# Patient Record
Sex: FEMALE | Race: WHITE | NOT HISPANIC OR LATINO | Employment: OTHER | URBAN - METROPOLITAN AREA
[De-identification: names, ages, dates, MRNs, and addresses within clinical notes are randomized per-mention and may not be internally consistent; named-entity substitution may affect disease eponyms.]

---

## 2021-05-24 ENCOUNTER — RECORDS - HEALTHEAST (OUTPATIENT)
Dept: ADMINISTRATIVE | Facility: CLINIC | Age: 76
End: 2021-05-24

## 2021-05-25 ENCOUNTER — RECORDS - HEALTHEAST (OUTPATIENT)
Dept: ADMINISTRATIVE | Facility: CLINIC | Age: 76
End: 2021-05-25

## 2021-05-29 ENCOUNTER — RECORDS - HEALTHEAST (OUTPATIENT)
Dept: ADMINISTRATIVE | Facility: CLINIC | Age: 76
End: 2021-05-29

## 2022-09-19 ENCOUNTER — TRANSFERRED RECORDS (OUTPATIENT)
Dept: MULTI SPECIALTY CLINIC | Facility: CLINIC | Age: 77
End: 2022-09-19

## 2022-09-19 LAB
CREATININE (EXTERNAL): 0.64 MG/DL (ref 0.55–1.02)
GFR ESTIMATED (EXTERNAL): >60 ML/MIN/1.73M2
GLUCOSE (EXTERNAL): 92 MG/DL (ref 70–100)
POTASSIUM (EXTERNAL): 5 MMOL/L (ref 3.5–5.1)

## 2022-09-28 RX ORDER — TRIAMCINOLONE ACETONIDE 1 MG/G
OINTMENT TOPICAL 2 TIMES DAILY PRN
COMMUNITY

## 2022-09-28 RX ORDER — AZITHROMYCIN 250 MG/1
TABLET, FILM COATED ORAL
COMMUNITY

## 2022-09-28 RX ORDER — WARFARIN SODIUM 5 MG/1
7.5-1 TABLET ORAL SEE ADMIN INSTRUCTIONS
COMMUNITY

## 2022-10-01 ENCOUNTER — HEALTH MAINTENANCE LETTER (OUTPATIENT)
Age: 77
End: 2022-10-01

## 2022-10-03 NOTE — PROVIDER NOTIFICATION
Discharge plan according to [facility]:       09/28/22 1427   Discharge Planning   Patient/Family Anticipates Transition to home   Concerns to be Addressed all concerns addressed in this encounter   Living Arrangements   People in Home significant other   Type of Residence Private Residence   Is your private residence a single family home or apartment? Single family home   Stair Railings, Within Home, Primary railings safe and in good condition   Once home, are you able to live on one level? Yes   Which level? Main Level   Bathroom Shower/Tub Walk-in shower   Equipment Currently Used at Home none   Support System   Support Systems Spouse/Significant Other

## 2022-10-07 RX ORDER — ENOXAPARIN SODIUM 100 MG/ML
100 INJECTION SUBCUTANEOUS EVERY 12 HOURS
Status: ON HOLD | COMMUNITY
Start: 2022-09-25 | End: 2022-10-13

## 2022-10-07 NOTE — PROGRESS NOTES
I am evaluating this patient for upcoming Right Total Knee Arthroplasty with Dr. Trotter at [facility] on 10/12/22:    - Patient is heterozygous for Factor V Leiden and is on chronic anticoagulation with Warfarin. Follows with Anticoagulation Clinic at [facility] and it looks like they have given patient instructions to bridge with full therapeutic dose Lovenox before and after surgery while she is holding warfarin for surgery. L/M for patient to please call me back today to discuss instructions for holding warfarin and bridging with Lovenox. Warfarin should be held 5 full days before surgery (last dose should be evening of 10/6, then held) and full therapeutic dose Lovenox must be held full 24 hrs before surgery time (last dose should be before 0800 on day before surgery 10/11/22, then held). I will confirm these directions with patient when she calls back. Call me below if any questions.     - Update 10/7/22 at 5:00 pm: Spoke to patient- confirmed that she will hold Warfarin 5 full days before surgery (last dose taken evening of 10/6/22, then holding). She will make sure to take last dose of therapeutic dose Lovenox by 0800 on 10/11/22, then hold Lovenox for full 24 hrs before surgery.      - Update 10/11/22 at 1:40 pm: patient's surgery time was just moved up to 0840 on 10/12/22. Patient took last dose of Lovenox at 0900 today (10/11/22) so now Lovenox will be held just under 24 hrs before new surgery time. Dr. Trotter notified and is ok proceeding. Preop RN will alert [facility] in case this affects ability to have spinal anesthesia with surgery tomorrow. Call me if any questions.       ALAN Zavala, CNP   Advanced Practice Nurse Navigator- Orthopedics  [facility]   Office Phone: [phone]  Direct Fax: [phone]

## 2022-10-11 ENCOUNTER — ANESTHESIA EVENT (OUTPATIENT)
Dept: SURGERY | Facility: CLINIC | Age: 77
End: 2022-10-11
Payer: MEDICARE

## 2022-10-11 NOTE — TREATMENT PLAN
Orthopedic Surgery Pre-Op Plan: Sagrario Vigil  pre-op review. This is NOT an H&P   Surgeon: Dr. Trotter    [facility]: [facility]  Name of Surgery: Right Total Knee Arthroplasty   Date of Surgery: 10/12/22  H&P: Completed on 9/19/22 by Dr. Tonia Bergman at [facility].   History of ASA, NSAIDS, vitamin and/or herbal supplements within 10 days: No  History of blood thinners: Yes- on chronic anticoagulation with Warfarin for Factor V Leiden with history of PE. Patient instructed to hold warfarin for 5 days before surgery (will take last dose evening of 10/6/22, then hold for surgery). Will bridge with full therapeutic dose Lovenox every 12 hrs and was instructed to hold Lovenox for 24 hrs before surgery. Patient took last dose of Lovenox at 0900 on 10/11/22, however, her surgery time was moved up to start at 0840 on 10/12/22. Dr. Trotter notified and is ok proceeding. Pre-op RN will alert [facility] as this could affect whether patient can receive spinal/neuraxial anesthesia.     Plan:   1) Discharge Plan: Home POD 1 with assist of Significant Other. Please see Discharge Planning section near bottom of this note for further details.     2) Coagulation Disorder: Factor V Leiden (Heterozygous) with history of submassive PE in 2/2020: On chronic anticoagulation with warfarin.  Follows with anticoagulation clinic through [facility].  Instructed to hold warfarin for 5 days before surgery (will take last dose evening of 10/6/2022, then hold it for surgery).  Bridging with full therapeutic dose Lovenox every 12 hours.  Patient was instructed to hold Lovenox for 24 hours before surgery.  However, surgery time was moved up.  Last dose of Lovenox taken just under 24 hours at 09 100 on 10/11/2022.  Dr. Trotter notified and is okay proceeding with surgery.  Preop RN will alert [facility] as this could affect whether patient is able to receive spinal/neuraxial anesthesia.  After surgery: Anticoagulation Clinic recommends  that warfarin is resumed evening of surgery and patient resumes bridging with Lovenox the morning after surgery if okay with orthopedic surgeon.  Bridging would continue until INR is back therapeutic on warfarin.  I will alert Dr. Trotter's team to do this recommendation.    3) Dyspnea on Exertion:  PCP ordered stress testing prior to surgery. NM Stress Test on 9/27/22:   Summary     1. A regadenoson infusion pharmacologic stress test was performed.     2. Negative stress ECG for ST segment depression. Normal hemodynamic   response to lexiscan infusion. No ectopy noted.     3. The patient experienced shortness of breath with infusion of lexiscan   during the stress test. Symptom resolved in recovery.     4. Myocardial perfusion imaging is normal.     5. Normal left ventricular systolic function. Calculated LVEF 64 %.     6. Based on this study, the annual cardiovascular mortality rate is low   (less than 1%).     Patient cleared by PCP for surgery at low risk after stress test above.      4) Hypertension: Appears well controlled on triamterene-hydrochlorothiazide.  Patient instructed to hold triamterene-hydrochlorothiazide on the morning of surgery.    5) History of Infection of Left Prosthetic Knee in 2015: S/P I&D, Poly-exchange: cultures showed MSSA. Treated with IV Vancomycin and rifampin.     6) Obesity: BMI 39, Wt: 229 lbs. recommend continued efforts at safe weight loss following recovery from surgery.     Patient appears medically optimized for upcoming surgery. I would recommend Hospitalist Consult to assist with medical management. Please call me below with any questions on this patient.       Review of Systems Notable for: Coagulation disorder-factor V Leiden (heterozygous) with history of PE-on chronic anticoagulation with warfarin, dyspnea on exertion-cleared for surgery after negative stress test, hypertension, history of infection of left prosthetic knee in 2015, obesity.    Past Medical History:    Past Medical History:   Diagnosis Date     Arthritis     Osteoarthritis     Coagulation disorder (H)     Factor V Leiden Hx of DVT and PE (in 2/2020)     Hypertension      MRSA infection     Hx of MRSA infaction post-op knee and was placed on ABX     Squamous cell carcinoma of skin of face     with MOHs procedure     Past Surgical History:   Procedure Laterality Date     FIBULA FRACTURE SURGERY       JOINT REPLACEMENT Bilateral     hip     LUMPECTOMY BREAST      benign     MOHS MICROGRAPHIC PROCEDURE       OTHER SURGICAL HISTORY      fractured leg     PICC AND MIDLINE TEAM LINE INSERTION  12/7/2015          [facility] REVISE KNEE JOINT REPLACE,ALL PARTS Left 12/6/2015    Procedure: Left Knee Incision and Drainage TIBIAL INSERT EXCHANGE;  Surgeon: Rinku Evans MD;  Location: [facility];  Service: Orthopedics     [facility] TOTAL KNEE ARTHROPLASTY Left 4/27/2015    Procedure: KNEE TOTAL ARTHROPLASTY LEFT;  Surgeon: Randolph Trotter MD;  Location: [facility] OR;  Service: Orthopedics       Current Medications:  Patient's Medications   New Prescriptions    No medications on file   Previous Medications    AZITHROMYCIN (ZITHROMAX) 250 MG TABLET    Before dental procedures    CLINDAMYCIN (CLEOCIN) 300 MG CAPSULE    Before dermatology procedures    DIPHENHYDRAMINE HCL (BENADRYL ALLERGY ORAL)    [DIPHENHYDRAMINE HCL (BENADRYL ALLERGY ORAL)] Take by mouth.    DIPHENHYDRAMINE-ACETAMINOPHEN (TYLENOL PM)  MG TAB    [DIPHENHYDRAMINE-ACETAMINOPHEN (TYLENOL PM)  MG TAB] Take 1 tablet by mouth bedtime.     ENOXAPARIN ANTICOAGULANT (LOVENOX) 100 MG/ML SYRINGE    Inject 100 mg Subcutaneous every 12 hours    EPINEPHRINE (EPIPEN) 0.3 MG/0.3 ML ATIN    [EPINEPHRINE (EPIPEN) 0.3 MG/0.3 ML ATIN] Inject 0.3 mg into the shoulder, thigh, or buttocks.    MEDICATION CANNOT BE REORDERED - PLEASE MANUALLY REORDER AND DISCONTINUE THE OLD ORDER    [EPINEPHRINE 0.3 MG/0.3 ML CMPK] Inject 0.3 mg into the shoulder, thigh, or buttocks  daily as needed.     RIFAMPIN (RIFADIN) 300 MG CAPSULE    Only when she had the infection    TRIAMCINOLONE (KENALOG) 0.1 % EXTERNAL OINTMENT        TRIAMTERENE-HYDROCHLOROTHIAZIDE (MAXZIDE-25) 37.5-25 MG PER TABLET    [TRIAMTERENE-HYDROCHLOROTHIAZIDE (MAXZIDE-25) 37.5-25 MG PER TABLET] Take 1 tablet by mouth daily.    TRIAMTERENE-HYDROCHLOROTHIAZIDE (MAXZIDE-25) 37.5-25 MG PER TABLET    [TRIAMTERENE-HYDROCHLOROTHIAZIDE (MAXZIDE-25) 37.5-25 MG PER TABLET] Take by mouth.    UNABLE TO FIND    [UNABLE TO FIND] Med Name:slimgenics-vitamin and other supplements    VALACYCLOVIR (VALTREX) 1000 MG TABLET    [VALACYCLOVIR (VALTREX) 1000 MG TABLET] Take 1,000 mg by mouth.    WARFARIN ANTICOAGULANT (COUMADIN) 5 MG TABLET    Take 5 mg by mouth   Modified Medications    No medications on file   Discontinued Medications    DOXYCYCLINE (MONODOX) 100 MG CAPSULE    [DOXYCYCLINE (MONODOX) 100 MG CAPSULE] TK ONE C PO  BID    DOXYCYCLINE (MONODOX) 100 MG CAPSULE    [DOXYCYCLINE (MONODOX) 100 MG CAPSULE] TAKE ONE CAPSULE BY MOUTH TWICE DAILY    DOXYCYCLINE (VIBRAMYCIN) 100 MG CAPSULE    [DOXYCYCLINE (VIBRAMYCIN) 100 MG CAPSULE] Take 100 mg by mouth daily.       ALLERGIES:  Allergies   Allergen Reactions     Fish Containing Products [Fish-Derived Products] Anaphylaxis     Fresh water fish, can eat shrimp     Adhesive Tape      Amoxicillin Hives     Bacitracin Itching     Banana GI Disturbance     Not latex     Codeine Hives     Tolerates oxycodone     Indocin [Indomethacin] Hives     Neosporin (Vfe-Stf-Adggl) [Triple Antibiotic] Itching       Social History  Social History     Tobacco Use     Smoking status: Former     Packs/day: 0.25     Years: 25.00     Pack years: 6.25     Types: Cigarettes     Smokeless tobacco: Never   Substance Use Topics     Alcohol use: Yes     Comment: 2-3 ETOH WKLY     Drug use: No       Any Abnormal Recent Diagnostics? No      Discharge Planning:   Discharge plan according to [facility]:           09/28/22 1420   Discharge Planning   Patient/Family Anticipates Transition to home   Concerns to be Addressed all concerns addressed in this encounter   Living Arrangements   People in Home significant other   Type of Residence Private Residence   Is your private residence a single family home or apartment? Single family home   Stair Railings, Within Home, Primary railings safe and in good condition   Once home, are you able to live on one level? Yes   Which level? Main Level   Bathroom Shower/Tub Walk-in shower   Equipment Currently Used at Home none   Support System   Support Systems Spouse/Significant Other        ALAN Zavala, CNP   Advanced Practice Nurse Navigator- Orthopedics  [facility]   Phone: [phone]

## 2022-10-12 ENCOUNTER — HOSPITAL ENCOUNTER (OUTPATIENT)
Facility: CLINIC | Age: 77
LOS: 1 days | Discharge: HOME | End: 2022-10-13
Attending: ORTHOPAEDIC SURGERY
Payer: MEDICARE

## 2022-10-12 ENCOUNTER — ANESTHESIA (OUTPATIENT)
Dept: SURGERY | Facility: CLINIC | Age: 77
End: 2022-10-12
Payer: MEDICARE

## 2022-10-12 DIAGNOSIS — D68.51 FACTOR V LEIDEN (H): ICD-10-CM

## 2022-10-12 DIAGNOSIS — Z96.651 S/P TOTAL KNEE ARTHROPLASTY, RIGHT: ICD-10-CM

## 2022-10-12 DIAGNOSIS — M17.11 PRIMARY OSTEOARTHRITIS OF RIGHT KNEE: Primary | ICD-10-CM

## 2022-10-12 PROBLEM — Z86.711 HISTORY OF PULMONARY EMBOLISM: Status: ACTIVE | Noted: 2022-10-12

## 2022-10-12 PROBLEM — I10 HYPERTENSION: Status: ACTIVE | Noted: 2022-10-12

## 2022-10-12 PROBLEM — M17.9 KNEE OSTEOARTHRITIS: Status: ACTIVE | Noted: 2022-10-12

## 2022-10-12 PROBLEM — Z86.14 HISTORY OF MRSA INFECTION: Status: ACTIVE | Noted: 2022-10-12

## 2022-10-12 LAB
CREAT SERPL-MCNC: 0.77 MG/DL (ref 0.6–1.1)
GFR SERPL CREATININE-BSD FRML MDRD: 79 ML/MIN/1.73M2
INR PPP: 1.25 (ref 0.85–1.15)
MRSA DNA SPEC QL NAA+PROBE: NEGATIVE
PLATELET # BLD AUTO: 158 10E3/UL (ref 150–450)
SA TARGET DNA: NEGATIVE

## 2022-10-12 PROCEDURE — 250N000011 HC RX IP 250 OP 636: Performed by: NURSE ANESTHETIST, CERTIFIED REGISTERED

## 2022-10-12 PROCEDURE — 370N000017 HC ANESTHESIA TECHNICAL FEE, PER MIN: Performed by: ORTHOPAEDIC SURGERY

## 2022-10-12 PROCEDURE — 258N000003 HC RX IP 258 OP 636: Performed by: ANESTHESIOLOGY

## 2022-10-12 PROCEDURE — 250N000013 HC RX MED GY IP 250 OP 250 PS 637: Performed by: PHYSICIAN ASSISTANT

## 2022-10-12 PROCEDURE — 99223 1ST HOSP IP/OBS HIGH 75: CPT | Performed by: FAMILY MEDICINE

## 2022-10-12 PROCEDURE — 85610 PROTHROMBIN TIME: CPT | Performed by: NURSE PRACTITIONER

## 2022-10-12 PROCEDURE — 710N000010 HC RECOVERY PHASE 1, LEVEL 2, PER MIN: Performed by: ORTHOPAEDIC SURGERY

## 2022-10-12 PROCEDURE — 85049 AUTOMATED PLATELET COUNT: CPT | Performed by: ORTHOPAEDIC SURGERY

## 2022-10-12 PROCEDURE — 250N000013 HC RX MED GY IP 250 OP 250 PS 637: Performed by: ORTHOPAEDIC SURGERY

## 2022-10-12 PROCEDURE — 250N000011 HC RX IP 250 OP 636: Performed by: ANESTHESIOLOGY

## 2022-10-12 PROCEDURE — 250N000011 HC RX IP 250 OP 636: Performed by: ORTHOPAEDIC SURGERY

## 2022-10-12 PROCEDURE — 272N000001 HC OR GENERAL SUPPLY STERILE: Performed by: ORTHOPAEDIC SURGERY

## 2022-10-12 PROCEDURE — 82565 ASSAY OF CREATININE: CPT | Performed by: ORTHOPAEDIC SURGERY

## 2022-10-12 PROCEDURE — 258N000003 HC RX IP 258 OP 636: Performed by: ORTHOPAEDIC SURGERY

## 2022-10-12 PROCEDURE — 258N000003 HC RX IP 258 OP 636: Performed by: PHYSICIAN ASSISTANT

## 2022-10-12 PROCEDURE — 36415 COLL VENOUS BLD VENIPUNCTURE: CPT | Performed by: NURSE PRACTITIONER

## 2022-10-12 PROCEDURE — 87641 MR-STAPH DNA AMP PROBE: CPT | Performed by: FAMILY MEDICINE

## 2022-10-12 PROCEDURE — 360N000077 HC SURGERY LEVEL 4, PER MIN: Performed by: ORTHOPAEDIC SURGERY

## 2022-10-12 PROCEDURE — C1713 ANCHOR/SCREW BN/BN,TIS/BN: HCPCS | Performed by: ORTHOPAEDIC SURGERY

## 2022-10-12 PROCEDURE — C1776 JOINT DEVICE (IMPLANTABLE): HCPCS | Performed by: ORTHOPAEDIC SURGERY

## 2022-10-12 PROCEDURE — 999N000141 HC STATISTIC PRE-PROCEDURE NURSING ASSESSMENT: Performed by: ORTHOPAEDIC SURGERY

## 2022-10-12 PROCEDURE — 250N000011 HC RX IP 250 OP 636: Performed by: PHYSICIAN ASSISTANT

## 2022-10-12 PROCEDURE — 120N000001 HC R&B MED SURG/OB

## 2022-10-12 PROCEDURE — 250N000009 HC RX 250: Performed by: ORTHOPAEDIC SURGERY

## 2022-10-12 PROCEDURE — 250N000009 HC RX 250: Performed by: NURSE ANESTHETIST, CERTIFIED REGISTERED

## 2022-10-12 PROCEDURE — 250N000009 HC RX 250: Performed by: ANESTHESIOLOGY

## 2022-10-12 PROCEDURE — 258N000001 HC RX 258: Performed by: ORTHOPAEDIC SURGERY

## 2022-10-12 PROCEDURE — 258N000003 HC RX IP 258 OP 636: Performed by: NURSE ANESTHETIST, CERTIFIED REGISTERED

## 2022-10-12 PROCEDURE — 36415 COLL VENOUS BLD VENIPUNCTURE: CPT | Performed by: ORTHOPAEDIC SURGERY

## 2022-10-12 DEVICE — IMPLANTABLE DEVICE
Type: IMPLANTABLE DEVICE | Site: KNEE | Status: FUNCTIONAL
Brand: PERSONA™

## 2022-10-12 DEVICE — IMPLANTABLE DEVICE
Type: IMPLANTABLE DEVICE | Site: KNEE | Status: FUNCTIONAL
Brand: PERSONA®

## 2022-10-12 DEVICE — BONE CEMENT RADIOPAQUE SIMPLEX HV FULL DOSE 6194-1-001: Type: IMPLANTABLE DEVICE | Site: KNEE | Status: FUNCTIONAL

## 2022-10-12 DEVICE — IMPLANTABLE DEVICE
Type: IMPLANTABLE DEVICE | Site: KNEE | Status: FUNCTIONAL
Brand: PERSONA® NATURAL TIBIA®

## 2022-10-12 RX ORDER — DEXAMETHASONE SODIUM PHOSPHATE 4 MG/ML
INJECTION, SOLUTION INTRA-ARTICULAR; INTRALESIONAL; INTRAMUSCULAR; INTRAVENOUS; SOFT TISSUE PRN
Status: DISCONTINUED | OUTPATIENT
Start: 2022-10-12 | End: 2022-10-12

## 2022-10-12 RX ORDER — MAGNESIUM HYDROXIDE 1200 MG/15ML
LIQUID ORAL PRN
Status: DISCONTINUED | OUTPATIENT
Start: 2022-10-12 | End: 2022-10-12 | Stop reason: HOSPADM

## 2022-10-12 RX ORDER — LIDOCAINE 40 MG/G
CREAM TOPICAL
Status: DISCONTINUED | OUTPATIENT
Start: 2022-10-12 | End: 2022-10-12

## 2022-10-12 RX ORDER — ONDANSETRON 4 MG/1
4 TABLET, ORALLY DISINTEGRATING ORAL EVERY 6 HOURS PRN
Status: DISCONTINUED | OUTPATIENT
Start: 2022-10-12 | End: 2022-10-13 | Stop reason: HOSPADM

## 2022-10-12 RX ORDER — TRANEXAMIC ACID 650 MG/1
1950 TABLET ORAL ONCE
Status: COMPLETED | OUTPATIENT
Start: 2022-10-12 | End: 2022-10-12

## 2022-10-12 RX ORDER — ONDANSETRON 2 MG/ML
INJECTION INTRAMUSCULAR; INTRAVENOUS PRN
Status: DISCONTINUED | OUTPATIENT
Start: 2022-10-12 | End: 2022-10-12

## 2022-10-12 RX ORDER — PROPOFOL 10 MG/ML
INJECTION, EMULSION INTRAVENOUS CONTINUOUS PRN
Status: DISCONTINUED | OUTPATIENT
Start: 2022-10-12 | End: 2022-10-12

## 2022-10-12 RX ORDER — PROCHLORPERAZINE MALEATE 5 MG
5 TABLET ORAL EVERY 6 HOURS PRN
Status: DISCONTINUED | OUTPATIENT
Start: 2022-10-12 | End: 2022-10-13 | Stop reason: HOSPADM

## 2022-10-12 RX ORDER — FENTANYL CITRATE 50 UG/ML
25 INJECTION, SOLUTION INTRAMUSCULAR; INTRAVENOUS EVERY 5 MIN PRN
Status: DISCONTINUED | OUTPATIENT
Start: 2022-10-12 | End: 2022-10-12 | Stop reason: HOSPADM

## 2022-10-12 RX ORDER — LIDOCAINE 40 MG/G
CREAM TOPICAL
Status: DISCONTINUED | OUTPATIENT
Start: 2022-10-12 | End: 2022-10-12 | Stop reason: HOSPADM

## 2022-10-12 RX ORDER — FENTANYL CITRATE 50 UG/ML
50 INJECTION, SOLUTION INTRAMUSCULAR; INTRAVENOUS
Status: DISCONTINUED | OUTPATIENT
Start: 2022-10-12 | End: 2022-10-12 | Stop reason: HOSPADM

## 2022-10-12 RX ORDER — AMOXICILLIN 250 MG
1-2 CAPSULE ORAL 2 TIMES DAILY
Qty: 30 TABLET | Refills: 0 | Status: SHIPPED | OUTPATIENT
Start: 2022-10-12

## 2022-10-12 RX ORDER — GLYCOPYRROLATE 0.2 MG/ML
INJECTION, SOLUTION INTRAMUSCULAR; INTRAVENOUS PRN
Status: DISCONTINUED | OUTPATIENT
Start: 2022-10-12 | End: 2022-10-12

## 2022-10-12 RX ORDER — OXYCODONE HYDROCHLORIDE 5 MG/1
5 TABLET ORAL EVERY 4 HOURS PRN
Status: DISCONTINUED | OUTPATIENT
Start: 2022-10-12 | End: 2022-10-12 | Stop reason: HOSPADM

## 2022-10-12 RX ORDER — NALOXONE HYDROCHLORIDE 0.4 MG/ML
0.2 INJECTION, SOLUTION INTRAMUSCULAR; INTRAVENOUS; SUBCUTANEOUS
Status: DISCONTINUED | OUTPATIENT
Start: 2022-10-12 | End: 2022-10-13 | Stop reason: HOSPADM

## 2022-10-12 RX ORDER — AMOXICILLIN 250 MG
1 CAPSULE ORAL 2 TIMES DAILY
Status: DISCONTINUED | OUTPATIENT
Start: 2022-10-12 | End: 2022-10-13 | Stop reason: HOSPADM

## 2022-10-12 RX ORDER — POLYETHYLENE GLYCOL 3350 17 G/17G
17 POWDER, FOR SOLUTION ORAL DAILY
Status: DISCONTINUED | OUTPATIENT
Start: 2022-10-13 | End: 2022-10-13 | Stop reason: HOSPADM

## 2022-10-12 RX ORDER — HYDROMORPHONE HCL IN WATER/PF 6 MG/30 ML
0.2 PATIENT CONTROLLED ANALGESIA SYRINGE INTRAVENOUS EVERY 5 MIN PRN
Status: DISCONTINUED | OUTPATIENT
Start: 2022-10-12 | End: 2022-10-12 | Stop reason: HOSPADM

## 2022-10-12 RX ORDER — LIDOCAINE HYDROCHLORIDE 10 MG/ML
INJECTION, SOLUTION INFILTRATION; PERINEURAL PRN
Status: DISCONTINUED | OUTPATIENT
Start: 2022-10-12 | End: 2022-10-12

## 2022-10-12 RX ORDER — CLINDAMYCIN PHOSPHATE 900 MG/50ML
900 INJECTION, SOLUTION INTRAVENOUS SEE ADMIN INSTRUCTIONS
Status: DISCONTINUED | OUTPATIENT
Start: 2022-10-12 | End: 2022-10-12 | Stop reason: HOSPADM

## 2022-10-12 RX ORDER — HYDROMORPHONE HCL IN WATER/PF 6 MG/30 ML
0.4 PATIENT CONTROLLED ANALGESIA SYRINGE INTRAVENOUS
Status: DISCONTINUED | OUTPATIENT
Start: 2022-10-12 | End: 2022-10-13 | Stop reason: HOSPADM

## 2022-10-12 RX ORDER — PROPOFOL 10 MG/ML
INJECTION, EMULSION INTRAVENOUS PRN
Status: DISCONTINUED | OUTPATIENT
Start: 2022-10-12 | End: 2022-10-12

## 2022-10-12 RX ORDER — NALOXONE HYDROCHLORIDE 0.4 MG/ML
0.4 INJECTION, SOLUTION INTRAMUSCULAR; INTRAVENOUS; SUBCUTANEOUS
Status: DISCONTINUED | OUTPATIENT
Start: 2022-10-12 | End: 2022-10-13 | Stop reason: HOSPADM

## 2022-10-12 RX ORDER — SODIUM CHLORIDE, SODIUM LACTATE, POTASSIUM CHLORIDE, CALCIUM CHLORIDE 600; 310; 30; 20 MG/100ML; MG/100ML; MG/100ML; MG/100ML
INJECTION, SOLUTION INTRAVENOUS CONTINUOUS
Status: DISCONTINUED | OUTPATIENT
Start: 2022-10-12 | End: 2022-10-12 | Stop reason: HOSPADM

## 2022-10-12 RX ORDER — ACETAMINOPHEN 325 MG/1
650 TABLET ORAL EVERY 4 HOURS PRN
Status: DISCONTINUED | OUTPATIENT
Start: 2022-10-15 | End: 2022-10-13 | Stop reason: HOSPADM

## 2022-10-12 RX ORDER — ONDANSETRON 4 MG/1
4 TABLET, ORALLY DISINTEGRATING ORAL EVERY 30 MIN PRN
Status: DISCONTINUED | OUTPATIENT
Start: 2022-10-12 | End: 2022-10-12 | Stop reason: HOSPADM

## 2022-10-12 RX ORDER — ENOXAPARIN SODIUM 100 MG/ML
100 INJECTION SUBCUTANEOUS EVERY 12 HOURS
Status: DISCONTINUED | OUTPATIENT
Start: 2022-10-13 | End: 2022-10-13 | Stop reason: HOSPADM

## 2022-10-12 RX ORDER — HYDROMORPHONE HYDROCHLORIDE 4 MG/1
4 TABLET ORAL EVERY 4 HOURS PRN
Status: DISCONTINUED | OUTPATIENT
Start: 2022-10-12 | End: 2022-10-13 | Stop reason: HOSPADM

## 2022-10-12 RX ORDER — BUPIVACAINE HYDROCHLORIDE AND EPINEPHRINE 2.5; 5 MG/ML; UG/ML
INJECTION, SOLUTION INFILTRATION; PERINEURAL
Status: COMPLETED | OUTPATIENT
Start: 2022-10-12 | End: 2022-10-12

## 2022-10-12 RX ORDER — HYDROMORPHONE HYDROCHLORIDE 2 MG/1
2 TABLET ORAL EVERY 4 HOURS PRN
Status: DISCONTINUED | OUTPATIENT
Start: 2022-10-12 | End: 2022-10-13 | Stop reason: HOSPADM

## 2022-10-12 RX ORDER — BISACODYL 10 MG
10 SUPPOSITORY, RECTAL RECTAL DAILY PRN
Status: DISCONTINUED | OUTPATIENT
Start: 2022-10-12 | End: 2022-10-13 | Stop reason: HOSPADM

## 2022-10-12 RX ORDER — ONDANSETRON 2 MG/ML
4 INJECTION INTRAMUSCULAR; INTRAVENOUS EVERY 6 HOURS PRN
Status: DISCONTINUED | OUTPATIENT
Start: 2022-10-12 | End: 2022-10-13 | Stop reason: HOSPADM

## 2022-10-12 RX ORDER — ACETAMINOPHEN 325 MG/1
325-650 TABLET ORAL EVERY 6 HOURS PRN
Status: ON HOLD | COMMUNITY
End: 2022-10-13

## 2022-10-12 RX ORDER — BUPIVACAINE HYDROCHLORIDE 7.5 MG/ML
INJECTION, SOLUTION INTRASPINAL
Status: COMPLETED | OUTPATIENT
Start: 2022-10-12 | End: 2022-10-12

## 2022-10-12 RX ORDER — HYDROMORPHONE HYDROCHLORIDE 2 MG/1
2-4 TABLET ORAL EVERY 4 HOURS PRN
Qty: 28 TABLET | Refills: 0 | Status: SHIPPED | OUTPATIENT
Start: 2022-10-12

## 2022-10-12 RX ORDER — TRIAMTERENE/HYDROCHLOROTHIAZID 37.5-25 MG
1 TABLET ORAL DAILY
Status: DISCONTINUED | OUTPATIENT
Start: 2022-10-12 | End: 2022-10-13 | Stop reason: HOSPADM

## 2022-10-12 RX ORDER — FLUOROURACIL 50 MG/G
CREAM TOPICAL 2 TIMES DAILY PRN
COMMUNITY

## 2022-10-12 RX ORDER — ACETAMINOPHEN 325 MG/1
975 TABLET ORAL EVERY 8 HOURS
Status: DISCONTINUED | OUTPATIENT
Start: 2022-10-12 | End: 2022-10-13 | Stop reason: HOSPADM

## 2022-10-12 RX ORDER — SODIUM CHLORIDE, SODIUM LACTATE, POTASSIUM CHLORIDE, CALCIUM CHLORIDE 600; 310; 30; 20 MG/100ML; MG/100ML; MG/100ML; MG/100ML
INJECTION, SOLUTION INTRAVENOUS CONTINUOUS
Status: DISCONTINUED | OUTPATIENT
Start: 2022-10-12 | End: 2022-10-12

## 2022-10-12 RX ORDER — HYDROMORPHONE HCL IN WATER/PF 6 MG/30 ML
0.2 PATIENT CONTROLLED ANALGESIA SYRINGE INTRAVENOUS
Status: DISCONTINUED | OUTPATIENT
Start: 2022-10-12 | End: 2022-10-13 | Stop reason: HOSPADM

## 2022-10-12 RX ORDER — ONDANSETRON 2 MG/ML
4 INJECTION INTRAMUSCULAR; INTRAVENOUS EVERY 30 MIN PRN
Status: DISCONTINUED | OUTPATIENT
Start: 2022-10-12 | End: 2022-10-12 | Stop reason: HOSPADM

## 2022-10-12 RX ORDER — CLINDAMYCIN PHOSPHATE 900 MG/50ML
900 INJECTION, SOLUTION INTRAVENOUS
Status: DISCONTINUED | OUTPATIENT
Start: 2022-10-12 | End: 2022-10-12 | Stop reason: HOSPADM

## 2022-10-12 RX ORDER — WARFARIN SODIUM 7.5 MG/1
7.5 TABLET ORAL
Status: COMPLETED | OUTPATIENT
Start: 2022-10-12 | End: 2022-10-12

## 2022-10-12 RX ORDER — FENTANYL CITRATE 50 UG/ML
100 INJECTION, SOLUTION INTRAMUSCULAR; INTRAVENOUS
Status: DISCONTINUED | OUTPATIENT
Start: 2022-10-12 | End: 2022-10-12 | Stop reason: HOSPADM

## 2022-10-12 RX ADMIN — BUPIVACAINE HYDROCHLORIDE IN DEXTROSE 1.6 ML: 7.5 INJECTION, SOLUTION SUBARACHNOID at 09:00

## 2022-10-12 RX ADMIN — TRANEXAMIC ACID 1950 MG: 650 TABLET ORAL at 07:16

## 2022-10-12 RX ADMIN — VANCOMYCIN HYDROCHLORIDE 1500 MG: 5 INJECTION, POWDER, LYOPHILIZED, FOR SOLUTION INTRAVENOUS at 18:00

## 2022-10-12 RX ADMIN — SODIUM CHLORIDE, POTASSIUM CHLORIDE, SODIUM LACTATE AND CALCIUM CHLORIDE: 600; 310; 30; 20 INJECTION, SOLUTION INTRAVENOUS at 07:18

## 2022-10-12 RX ADMIN — FENTANYL CITRATE 50 MCG: 50 INJECTION, SOLUTION INTRAMUSCULAR; INTRAVENOUS at 08:26

## 2022-10-12 RX ADMIN — PROPOFOL 40 MG: 10 INJECTION, EMULSION INTRAVENOUS at 09:11

## 2022-10-12 RX ADMIN — WARFARIN SODIUM 7.5 MG: 7.5 TABLET ORAL at 18:00

## 2022-10-12 RX ADMIN — DEXAMETHASONE SODIUM PHOSPHATE 10 MG: 4 INJECTION, SOLUTION INTRA-ARTICULAR; INTRALESIONAL; INTRAMUSCULAR; INTRAVENOUS; SOFT TISSUE at 09:19

## 2022-10-12 RX ADMIN — ACETAMINOPHEN 975 MG: 325 TABLET, FILM COATED ORAL at 23:11

## 2022-10-12 RX ADMIN — PROPOFOL 150 MCG/KG/MIN: 10 INJECTION, EMULSION INTRAVENOUS at 09:11

## 2022-10-12 RX ADMIN — CLINDAMYCIN PHOSPHATE 900 MG: 900 INJECTION, SOLUTION INTRAVENOUS at 07:27

## 2022-10-12 RX ADMIN — PHENYLEPHRINE HYDROCHLORIDE 100 MCG: 10 INJECTION INTRAVENOUS at 09:21

## 2022-10-12 RX ADMIN — MIDAZOLAM HYDROCHLORIDE 2 MG: 1 INJECTION, SOLUTION INTRAMUSCULAR; INTRAVENOUS at 08:25

## 2022-10-12 RX ADMIN — SENNOSIDES AND DOCUSATE SODIUM 1 TABLET: 50; 8.6 TABLET ORAL at 20:14

## 2022-10-12 RX ADMIN — PHENYLEPHRINE HYDROCHLORIDE 0.2 MCG/KG/MIN: 10 INJECTION INTRAVENOUS at 09:23

## 2022-10-12 RX ADMIN — SODIUM CHLORIDE, POTASSIUM CHLORIDE, SODIUM LACTATE AND CALCIUM CHLORIDE: 600; 310; 30; 20 INJECTION, SOLUTION INTRAVENOUS at 10:09

## 2022-10-12 RX ADMIN — BUPIVACAINE HYDROCHLORIDE AND EPINEPHRINE BITARTRATE 15 ML: 2.5; .005 INJECTION, SOLUTION INFILTRATION; PERINEURAL at 08:26

## 2022-10-12 RX ADMIN — ACETAMINOPHEN 975 MG: 325 TABLET, FILM COATED ORAL at 14:49

## 2022-10-12 RX ADMIN — GLYCOPYRROLATE 0.2 MG: 0.2 INJECTION, SOLUTION INTRAMUSCULAR; INTRAVENOUS at 09:47

## 2022-10-12 RX ADMIN — ONDANSETRON 4 MG: 2 INJECTION INTRAMUSCULAR; INTRAVENOUS at 10:09

## 2022-10-12 RX ADMIN — LIDOCAINE HYDROCHLORIDE 20 MG: 10 INJECTION, SOLUTION INFILTRATION; PERINEURAL at 09:11

## 2022-10-12 ASSESSMENT — ACTIVITIES OF DAILY LIVING (ADL)
DIFFICULTY_COMMUNICATING: NO
WEAR_GLASSES_OR_BLIND: YES
CONCENTRATING,_REMEMBERING_OR_MAKING_DECISIONS_DIFFICULTY: NO
WALKING_OR_CLIMBING_STAIRS_DIFFICULTY: YES
EQUIPMENT_CURRENTLY_USED_AT_HOME: CANE, STRAIGHT
ADLS_ACUITY_SCORE: 26
TOILETING_ISSUES: NO
DIFFICULTY_EATING/SWALLOWING: NO
ADLS_ACUITY_SCORE: 26
TRANSFERRING: 1-->ASSISTANCE (EQUIPMENT/PERSON) NEEDED
ADLS_ACUITY_SCORE: 28
CHANGE_IN_FUNCTIONAL_STATUS_SINCE_ONSET_OF_CURRENT_ILLNESS/INJURY: NO
TRANSFERRING: 0-->ASSISTANCE NEEDED (DEVELOPMENTALLY APPROPRIATE)
WALKING_OR_CLIMBING_STAIRS: AMBULATION DIFFICULTY, REQUIRES EQUIPMENT
ADLS_ACUITY_SCORE: 28
HEARING_DIFFICULTY_OR_DEAF: NO
FALL_HISTORY_WITHIN_LAST_SIX_MONTHS: YES
ADLS_ACUITY_SCORE: 26
ADLS_ACUITY_SCORE: 22
ADLS_ACUITY_SCORE: 24
NUMBER_OF_TIMES_PATIENT_HAS_FALLEN_WITHIN_LAST_SIX_MONTHS: 1
ADLS_ACUITY_SCORE: 28
ADLS_ACUITY_SCORE: 30
DRESSING/BATHING_DIFFICULTY: NO
DOING_ERRANDS_INDEPENDENTLY_DIFFICULTY: NO

## 2022-10-12 NOTE — PHARMACY-ANTICOAGULATION SERVICE
Clinical Pharmacy - Warfarin Dosing Consult     Pharmacy has been consulted to manage this patient s warfarin therapy.  Indication: Other - specify in comments (Hx of DVT, Factor V Leiden mutation)  Therapy Goal: INR 2-3  Warfarin Prior to Admission: Yes  Warfarin PTA Regimen: 10 mg Mondays and Fridays. 7.5 mg all other days of the week.  Significant drug interactions: Lovenox - being bridged until INR > 1.8. New meds: Clindamycin pre-op, vanco post-op x 1.  Dose Comments: Resuming warfarin post-op. Will start with home dose and monitor.    INR   Date Value Ref Range Status   10/12/2022 1.25 (H) 0.85 - 1.15 Final       Recommend warfarin 7.5 mg today.  Pharmacy will monitor Sagrario Vigil daily and order warfarin doses to achieve specified goal.      Please contact pharmacy as soon as possible if the warfarin needs to be held for a procedure or if the warfarin goals change.

## 2022-10-12 NOTE — OP NOTE
Operative Report    PATIENT:  Sagrario Vigil    DATE OF SURGERY:  10/12/2022    SURGEON  Randolph Trotter MD.      FIRST ASSISTANT  Marcio Doss PA-C  (Expert CLEOPATRA assist was required throughout for patient positioning, soft tissue retraction, appropriate use of knee instrumentation, and patient safety)     PREOPERATIVE DIAGNOSIS  right knee osteoarthritis     POSTOPERATIVE DIAGNOSIS  right knee osteoarthritis.         PROCEDURE  right Total Knee Arthroplasty.         ANESTHESIA  Spinal    SPECIMENS: none     ESTIMATED BLOOD LOSS  100cc     INDICATIONS  Ms. Sagrario Vigil is a pleasant 77 year old-year-old female with an ongoing history of increasing and progressive pain in the right knee with severe disability. Pain and disability due to knee arthritis are severely affecting quality of life and ability to perform even simple activities of daily living.  X-rays have shown bone-on-bone degenerative change. Consequently after trying and failing all conservative management of knee arthritis, discussion regarding the risk and benefits of knee replacement was undertaken and the patient elected to proceed.     FINDINGS:  The operative knee showed a severe full-thickness cartilage loss on the femur and tibia in the medial compartment of the knee.  The patellofemoral joint also showed advanced arthritic changes.      IMPLANTS  1. Raquel, Persona, CR femoral component, size 8 .  2. Raquel, Persona, tibial component, size E.  3. Raquel, Persona,  all polyethylene articular surface 10 mm thickness.  UC  4. Raquel, Persona, all polyethylene patellar button, 35mm diameter      PROCEDURE  Once consent was obtained and the operative site marked in the preop holding area, the patient was brought to the operating room.  Anesthesia was established without difficulty. All bony prominences and the non-operative leg were padded appropriately. The right leg was sterilely prepped and draped in the usual fashion after placement of a  proximal tourniquet.  Tourniquet was never inflated.     A longitudinal incision made over the knee.  Dissection was carried down through the extensor mechanism.  A medial parapatellar arthrotomy  was performed.  The patella was luxed laterally and protected. Standard medial release performed.    An intramedullary guide was used in the femur.  The distal femoral was made at 4 degrees of valgus.  The femoral cutting block  was applied and the rest of the femoral cuts completed. ACL was removed and the PCL was recessed.    Attention was then turned to the tibia.  This was cut using an extramedullary tibial cutting guide perpendicular to its mechanical axis.  The trial tibial and femoral components were then placed and the knee reduced. The patella was resurfaced and found to track well. Flexion and extension gaps were appropriate and varus valgus stability was good.     The knee was copiously irrigated and all bony surfaces dried.  Cement was mixed and all components were cemented and held until firm.  The knee was again trialed.  The  Polyethylene was opened and snapped into place, the locking mechanism was ensured.  The knee was copiously irrigated. The extensor mechanism was closed with # 1 interrupted Vicryl suture. Deep dermis was closed layer-wise of # 2-0 interrupted inverted Vicryl sutures followed by running # 3-0 Monocryl in the skin.  Dressings were applied. The patient tolerated the procedure well and was returned to the postop recovery area in stable condition.          RANDOLPH HERNANDEZ MD    @C(1)@  Randolph Hernandez MD    @C(2)@  Tonia Bergman

## 2022-10-12 NOTE — ANESTHESIA CARE TRANSFER NOTE
Patient: Sagrario Vigil    Procedure: Procedure(s):  RIGHT TOTAL KNEE ARTHROPLASTY       Diagnosis: OA (osteoarthritis) of knee [M17.9]  Diagnosis Additional Information: No value filed.    Anesthesia Type:   Spinal     Note:    Oropharynx: oropharynx clear of all foreign objects  Level of Consciousness: awake  Oxygen Supplementation: face mask  Level of Supplemental Oxygen (L/min / FiO2): 8  Independent Airway: airway patency satisfactory and stable  Dentition: dentition unchanged  Vital Signs Stable: post-procedure vital signs reviewed and stable  Report to RN Given: handoff report given  Patient transferred to: PACU    Handoff Report: Identifed the Patient, Identified the Reponsible Provider, Reviewed the pertinent medical history, Discussed the surgical course, Reviewed Intra-OP anesthesia mangement and issues during anesthesia, Set expectations for post-procedure period and Allowed opportunity for questions and acknowledgement of understanding      Vitals:  Vitals Value Taken Time   BP 96/53 10/12/22 1038   Temp 36.8  C (98.2  F) 10/12/22 1038   Pulse 51 10/12/22 1038   Resp 12 10/12/22 1038   SpO2 100 % 10/12/22 1038       Electronically Signed By: ALAN Joiner CRNA  October 12, 2022  10:40 AM

## 2022-10-12 NOTE — ANESTHESIA POSTPROCEDURE EVALUATION
Patient: Sagrario Vigil    Procedure: Procedure(s):  RIGHT TOTAL KNEE ARTHROPLASTY       Anesthesia Type:  Spinal    Note:  Disposition: Admission   Postop Pain Control: Uneventful            Sign Out: Well controlled pain   PONV: No   Neuro/Psych: Uneventful            Sign Out: Acceptable/Baseline neuro status   Airway/Respiratory: Uneventful            Sign Out: Acceptable/Baseline resp. status   CV/Hemodynamics: Uneventful            Sign Out: Acceptable CV status; No obvious hypovolemia; No obvious fluid overload   Other NRE: NONE   DID A NON-ROUTINE EVENT OCCUR? No           Last vitals:  Vitals Value Taken Time   /58 10/12/22 1110   Temp 36.8  C (98.2  F) 10/12/22 1038   Pulse 51 10/12/22 1110   Resp 20 10/12/22 1110   SpO2 100 % 10/12/22 1110       Electronically Signed By: Mark Romano MD  October 12, 2022  12:10 PM

## 2022-10-12 NOTE — ANESTHESIA PREPROCEDURE EVALUATION
Anesthesia Pre-Procedure Evaluation    Patient: Sagrario Vigil   MRN: [id number] : 1945        Procedure : Procedure(s):  RIGHT TOTAL KNEE ARTHROPLASTY          Past Medical History:   Diagnosis Date     Arthritis     Osteoarthritis     Coagulation disorder (H)     Factor V Leiden Hx of DVT and PE (in 2020)     Hypertension      MRSA infection     Hx of MRSA infaction post-op knee and was placed on ABX     Squamous cell carcinoma of skin of face     with MOHs procedure      Past Surgical History:   Procedure Laterality Date     FIBULA FRACTURE SURGERY       JOINT REPLACEMENT Bilateral     hip     LUMPECTOMY BREAST      benign     MOHS MICROGRAPHIC PROCEDURE       OTHER SURGICAL HISTORY      fractured leg     PICC AND MIDLINE TEAM LINE INSERTION  2015          [facility] REVISE KNEE JOINT REPLACE,ALL PARTS Left 2015    Procedure: Left Knee Incision and Drainage TIBIAL INSERT EXCHANGE;  Surgeon: Rinku Evans MD;  Location: [facility] OR;  Service: Orthopedics     [facility] TOTAL KNEE ARTHROPLASTY Left 2015    Procedure: KNEE TOTAL ARTHROPLASTY LEFT;  Surgeon: Randolph Trotter MD;  Location: [facility] OR;  Service: Orthopedics      Allergies   Allergen Reactions     Fish Containing Products [Fish-Derived Products] Anaphylaxis     Fresh water fish, can eat shrimp     Adhesive Tape      Amoxicillin Hives     Bacitracin Itching     Banana GI Disturbance     Not latex     Codeine Hives     Tolerates oxycodone     Indocin [Indomethacin] Hives     Neosporin (Vjp-Qpt-Sturh) [Triple Antibiotic] Itching      Social History     Tobacco Use     Smoking status: Former     Packs/day: 0.25     Years: 25.00     Pack years: 6.25     Types: Cigarettes     Smokeless tobacco: Never   Substance Use Topics     Alcohol use: Yes     Comment: 2-3 ETOH WKLY      Wt Readings from Last 1 Encounters:   10/12/22 104.2 kg (229 lb 11.2 oz)        Anesthesia Evaluation            ROS/MED HX  ENT/Pulmonary:  - neg pulmonary  ROS     Neurologic:  - neg neurologic ROS     Cardiovascular:  - neg cardiovascular ROS   (+) hypertension-----    METS/Exercise Tolerance:     Hematologic:  - neg hematologic  ROS     Musculoskeletal:  - neg musculoskeletal ROS     GI/Hepatic:  - neg GI/hepatic ROS     Renal/Genitourinary:  - neg Renal ROS     Endo:  - neg endo ROS     Psychiatric/Substance Use:  - neg psychiatric ROS     Infectious Disease:  - neg infectious disease ROS     Malignancy:  - neg malignancy ROS     Other:  - neg other ROS          Physical Exam    Airway        Mallampati: II    Neck ROM: full     Respiratory Devices and Support         Dental           Cardiovascular   cardiovascular exam normal          Pulmonary   pulmonary exam normal                OUTSIDE LABS:  CBC: No results found for: WBC, HGB, HCT, PLT  BMP: No results found for: NA, POTASSIUM, CHLORIDE, CO2, BUN, CR, GLC  COAGS:   Lab Results   Component Value Date    INR 1.25 (H) 10/12/2022     POC: No results found for: BGM, HCG, HCGS  HEPATIC: No results found for: ALBUMIN, PROTTOTAL, ALT, AST, GGT, ALKPHOS, BILITOTAL, BILIDIRECT, DOMI  OTHER: No results found for: PH, LACT, A1C, LEORA, PHOS, MAG, LIPASE, AMYLASE, TSH, T4, T3, CRP, SED    Anesthesia Plan    ASA Status:  2   NPO Status:  NPO Appropriate    Anesthesia Type: Spinal.              Consents    Anesthesia Plan(s) and associated risks, benefits, and realistic alternatives discussed. Questions answered and patient/representative(s) expressed understanding.    - Discussed:     - Discussed with:  Patient         Postoperative Care    Pain management: Peripheral nerve block (Single Shot).   PONV prophylaxis: Ondansetron (or other 5HT-3), Dexamethasone or Solumedrol     Comments:                Mark Romano MD

## 2022-10-12 NOTE — ANESTHESIA PROCEDURE NOTES
Adductor canal Procedure Note    Pre-Procedure   Staff -        Anesthesiologist:  Mark Romano MD       Performed By: anesthesiologist       Location: pre-op       Procedure Start/Stop Times: 10/12/2022 8:26 AM and 10/12/2022 8:32 AM       Pre-Anesthestic Checklist: patient identified, IV checked, site marked, risks and benefits discussed, informed consent, monitors and equipment checked, pre-op evaluation, at physician/surgeon's request and post-op pain management  Timeout:       Correct Patient: Yes        Correct Procedure: Yes        Correct Site: Yes        Correct Position: Yes        Correct Laterality: Yes        Site Marked: Yes  Procedure Documentation  Procedure: Adductor canal       Laterality: right       Patient Position: supine       Skin prep: Chloraprep       Needle Type: insulated       Needle Gauge: 20.        Needle Length (Inches): 6        1. Ultrasound was used to identify targeted nerve, plexus, vascular marker, or fascial plane and place a needle adjacent to it in real-time.       2. Ultrasound was used to visualize the spread of anesthetic in close proximity to the above referenced structure.       3. A permanent image is entered into the patient's record.       4. The visualized anatomic structures appeared normal.       5. There were no apparent abnormal pathologic findings.    Assessment/Narrative         The placement was negative for: blood aspirated, painful injection and site bleeding       Paresthesias: No.       Injection made incrementally with aspirations every 5 mL.    Medication(s) Administered   Bupivacaine 0.25% w/ 1:200K Epi (Injection) - Injection   15 mL - 10/12/2022 8:26:00 AM  Medication Administration Time: 10/12/2022 8:26 AM

## 2022-10-12 NOTE — PLAN OF CARE
Problem: Knee Arthroplasty  Goal: Acceptable Pain Control  Outcome: Progressing     Problem: Knee Arthroplasty  Goal: Effective Urinary Elimination  Outcome: Progressing    Patient vital signs are at baseline: Yes  Patient able to ambulate as they were prior to admission or with assist devices provided by therapies during their stay:  No,  Reason: Pt has not yet been seen by therapy.  Patient MUST void prior to discharge:  No,  Reason: Pt due to void  Patient able to tolerate oral intake:  Yes  Pain has adequate pain control using Oral analgesics: Yes, pt still experiencing no pain from the spinal.  Does patient have an identified :  Yes  Has goal D/C date and time been discussed with patient:  Yes    Patient is A&O, CMS intact, and dressing CDI. Tolerates oral intake with no N/V. Has not gotten up out of bed to ambulate yet. Will continue to monitor.  Kayy Driver RN

## 2022-10-12 NOTE — PLAN OF CARE
Pt eating, drinking, voiding and has bowel sounds.     Patient vital signs are at baseline: Yes  Patient able to ambulate as they were prior to admission or with assist devices provided by therapies during their stay:  Yes  Patient MUST void prior to discharge:  Yes  Patient able to tolerate oral intake:  Yes  Pain has adequate pain control using Oral analgesics:  Yes  Does patient have an identified :  Yes  Has goal D/C date and time been discussed with patient:  Yes    Continue to monitor VS, labs, pain level, incision site and activity tolerance.         Goal Outcome Evaluation:

## 2022-10-12 NOTE — ANESTHESIA PROCEDURE NOTES
Intrathecal injection Procedure Note    Pre-Procedure   Staff -        Anesthesiologist:  Mark Romano MD       Performed By: anesthesiologist       Location: OR       Procedure Start/Stop Times: 10/12/2022 9:00 AM and 10/12/2022 9:10 AM       Pre-Anesthestic Checklist: patient identified, IV checked, risks and benefits discussed, informed consent, monitors and equipment checked, pre-op evaluation, at physician/surgeon's request and post-op pain management  Timeout:       Correct Patient: Yes        Correct Procedure: Yes        Correct Site: Yes        Correct Position: Yes   Procedure Documentation  Procedure: intrathecal injection       Patient Position: sitting       Patient Prep/Sterile Barriers: sterile gloves, mask, patient draped       Skin prep: Chloraprep       Insertion Site: L2-3. (midline approach).       Needle Gauge: 22.        Needle Length (Inches): 4        Spinal Needle Type: Pencan       Introducer used       # of attempts: 2 and  # of redirects:  2    Assessment/Narrative         Paresthesias: No.       CSF fluid: clear.       Opening pressure was cmH2O while  Sitting.      Medication(s) Administered   0.75% Hyperbaric Bupivacaine (Intrathecal) - Intrathecal   1.6 mL - 10/12/2022 9:00:00 AM  Medication Administration Time: 10/12/2022 9:00 AM

## 2022-10-12 NOTE — CONSULTS
[facility] MEDICINE CONSULT NOTE   Physician requesting consult: Randolph Trotter MD    Reason for consult: Postoperative medical management of medical co-morbidities as below    Identification/Summary:   Sagrario Vigil is a 77 year old female with a PMH of factor V Leiden, DVT/PE, MGUS, HTN and MRSA 2015.  Underwent right total knee arthroplasty.  Doing well postoperatively.  Pain under good control.     Assessment and Plan:  -Status post right total knee arthroplasty  Pain under good control.  Further management per PT OT and orthopedics.  -History of MRSA 2015  This occurred with previous knee surgery.  Did undergo decolonization protocols.  Has a negative MRSA nasal swab from 9/19/2022 however this is the only 1 she has done.  Ordered contact precautions.  Need to have 2 negative swabs 2 weeks apart.  Order MRSA nasal swab.  If this is negative then can discontinue contact precautions.  -Factor V Leiden heterozygote  -History of DVT and PE  -Chronic anticoagulation  Prior to surgery patient was on warfarin and used Lovenox for bridging therapy.  Lovenox ordered by orthopedics to initiate on 10/13 at 0800 hrs.  Continue until INR greater than 1.8.  Does have extra Lovenox at home.  Warfarin will be initiated this evening.  Further management per pharmacy.  INR goal 2-3.  -Essential hypertension  Order home Maxide with hold parameters.  -History of MGUS  Noted.  Preoperative hemoglobin 14.2.  Follow per standard protocols.    COVID-19 PCR negative from 10/9/2022  Noted.  Standard precautions.    Fluids: Saline lock  Pain meds: Per orthopedics  Therapy: Per orthopedics  Lewis:Not present  Current Diet  Orders Placed This Encounter      Discharge Instruction - Regular Diet Adult      Regular Diet Adult    Supplements  None      -Disposition   per orthopedics    Code status:Full Code   [facility] service was asked to evaluate patient for postoperative medical management as follows below. Please  "resume the home medications as reconciled and further noted with ordered hold parameters.  Thank you for this consult; we will continue to follow this patient until discharge.    Procedure(s):  RIGHT TOTAL KNEE ARTHROPLASTY  Day of Surgery  Estimated Blood Loss:  100 mL  Hospital Problem List   No problem-specific Assessment & Plan notes found for this encounter.    Principal Problem:    Knee osteoarthritis      -Reviewed the patient's preoperative H and P and updated missing elements.  -Home medication reconciliation has been reviewed.  Medications have been ordered as noted from the home list and changes are documented above     Clinically Significant Risk Factors Present on Admission               # Coagulation Defect: home medication list includes an anticoagulant medication      # Obesity: Estimated body mass index is 39.43 kg/m  as calculated from the following:    Height as of this encounter: 1.626 m (5' 4\").    Weight as of this encounter: 104.2 kg (229 lb 11.2 oz).        HISTORY     Sagrario Vigil is a 77 year old female with PMH of factor V Leiden, DVT/PE, MGUS, HTN and MRSA 2015.  Underwent right total knee arthroplasty.  Doing well postoperatively.  Pain under good control.  Doing well postoperatively.  No shortness of breath.  No chest pain.  No nausea or vomiting.  History of MRSA 2015 following a previous knee procedure.  Did go through decolonization therapy.  Had a negative MRSA nasal swab from 9/19/2022 but has had no other negative testing performed according to the patient.  Does use warfarin chronically due to her history of factor V Leiden heterozygote status and PE DVTs.  Chronically on warfarin therapy INR goal 2-3.  Had used Lovenox as bridging therapy.  Denies personal history of heart attack, stroke, diabetes, peptic ulcer disease or sleep apnea..   COVID-19 PCR negative from 10/9/2022.  .  Questions answered to verbalized satisfaction.    Past Medical History     Past Medical " History:  No date: Arthritis      Comment:  Osteoarthritis  No date: Coagulation disorder (H)      Comment:  Factor V Leiden Hx of DVT and PE (in 2/2020)  No date: Hypertension  No date: MRSA infection      Comment:  Hx of MRSA infaction post-op knee and was placed on ABX  No date: Squamous cell carcinoma of skin of face      Comment:  with MOHs procedure     Patient Active Problem List    Diagnosis Date Noted     Knee osteoarthritis 10/12/2022     Priority: Medium     Surgical History     Past Surgical History:   Procedure Laterality Date     FIBULA FRACTURE SURGERY       JOINT REPLACEMENT Bilateral     hip     LUMPECTOMY BREAST      benign     MOHS MICROGRAPHIC PROCEDURE       OTHER SURGICAL HISTORY      fractured leg     PICC AND MIDLINE TEAM LINE INSERTION  12/7/2015          [facility] REVISE KNEE JOINT REPLACE,ALL PARTS Left 12/6/2015    Procedure: Left Knee Incision and Drainage TIBIAL INSERT EXCHANGE;  Surgeon: Rinku Evans MD;  Location: [facility];  Service: Orthopedics     [facility] TOTAL KNEE ARTHROPLASTY Left 4/27/2015    Procedure: KNEE TOTAL ARTHROPLASTY LEFT;  Surgeon: Randolph Trotter MD;  Location: [facility];  Service: Orthopedics     Family History      Family History   Problem Relation Age of Onset     Cancer Mother      Heart Disease Mother      Cancer Father      Heart Disease Father       Social History      Social History     Tobacco Use     Smoking status: Former     Packs/day: 0.25     Years: 25.00     Pack years: 6.25     Types: Cigarettes     Smokeless tobacco: Never   Substance Use Topics     Alcohol use: Yes     Comment: 2-3 ETOH WKLY     Drug use: No      Allergies     Allergies   Allergen Reactions     Fish Containing Products [Fish-Derived Products] Anaphylaxis     Fresh water fish, can eat shrimp     Adhesive Tape      Amoxicillin Hives     Bacitracin Itching     Banana GI Disturbance     Not latex     Codeine Hives     Tolerates oxycodone     Indocin [Indomethacin] Hives      Neosporin (Ygb-Lqz-Fehma) [Triple Antibiotic] Itching       Prior to Admission Medications      Prior to Admission Medications   Prescriptions Last Dose Informant Patient Reported? Taking?   EPINEPHrine (EPIPEN) 0.3 mg/0.3 mL atIn   Yes No   Sig: [EPINEPHRINE (EPIPEN) 0.3 MG/0.3 ML ATIN] Inject 0.3 mg into the shoulder, thigh, or buttocks.   acetaminophen (TYLENOL) 325 MG tablet 10/11/2022 at pm  Yes Yes   Sig: Take 1-2 tablets (325-650 mg) by mouth every 6 hours as needed for mild pain   azithromycin (ZITHROMAX) 250 MG tablet   Yes Yes   Sig: Before dental procedures   clindamycin (CLEOCIN) 300 MG capsule   Yes No   Sig: Before dermatology procedures   enoxaparin ANTICOAGULANT (LOVENOX) 100 MG/ML syringe 10/11/2022 at 0900  Yes Yes   Sig: Inject 100 mg Subcutaneous every 12 hours   fluorouracil (EFUDEX) 5 % external cream prn  Yes Yes   Sig: Apply topically 2 times daily as needed   triamcinolone (KENALOG) 0.1 % external ointment prn  Yes Yes   Sig: Apply topically 2 times daily as needed   triamterene-hydrochlorothiazide (MAXZIDE-25) 37.5-25 mg per tablet 10/7/2022 at Unknown time  Yes Yes   Sig: [TRIAMTERENE-HYDROCHLOROTHIAZIDE (MAXZIDE-25) 37.5-25 MG PER TABLET] Take 1 tablet by mouth daily.   valACYclovir (VALTREX) 1000 MG tablet prn  Yes No   Sig: Take 1 tablet (1,000 mg) by mouth as needed   warfarin ANTICOAGULANT (COUMADIN) 5 MG tablet 10/6/2022  Yes Yes   Sig: Take 1.5-2 tablets (7.5-10 mg) by mouth See Admin Instructions 10mg Monday & Friday; 7.5mg ROW      Facility-Administered Medications: None      Review of Systems     A 12 point comprehensive review of systems was negative except as noted above in HPI.    OBJECTIVE         Physical Exam   Temp:  [97.4  F (36.3  C)-98.2  F (36.8  C)] 97.6  F (36.4  C)  Pulse:  [50-64] 50  Resp:  [12-36] 20  BP: ()/(50-76) 145/68  SpO2:  [95 %-100 %] 98 %  Body mass index is 39.43 kg/m .  Constitutional: awake, alert, cooperative, no apparent distress, and  appears stated age and moderately obese  Eyes: Lids and lashes normal, pupils equal, round and reactive to light, extra ocular muscles intact, sclera clear, conjunctiva normal  ENT: Normocephalic, without obvious abnormality, atraumatic, sinuses nontender on palpation, external ears without lesions, oral pharynx with moist mucous membranes, tonsils without erythema or exudates, gums normal and good dentition.  Hematologic / Lymphatic: no cervical lymphadenopathy and no supraclavicular lymphadenopathy  Respiratory: No increased work of breathing, good air exchange, clear to auscultation bilaterally, no crackles or wheezing  Cardiovascular: Normal apical impulse, regular rate and rhythm, normal S1 and S2, no S3 or S4, and no murmur noted  GI: No scars, normal bowel sounds, soft, non-distended, non-tender, no masses palpated, no hepatosplenomegally  Skin: normal skin color, texture, turgor, no redness, warmth, or swelling, and no rashes  Musculoskeletal: There is no redness, warmth, or swelling of the joints.  Full range of motion noted.  Motor strength is 5 out of 5 all extremities bilaterally.  Tone is normal. no lower extremity pitting edema present  Neurologic: Cranial nerves II-XII are grossly intact. Sensory:  Sensory intact  Neuropsychiatric: General: normal, calm and normal eye contact Level of consciousness: alert / normal Affect: normal Orientation: oriented to self, place, time and situation Memory and insight: normal, memory for past and recent events intact and thought process normal      Cardiographics Reviewed Personally By Myself         NM Card Myocard Lexiscan Rest/Stress Spect (09/27/2022 11:55 AM CDT)  Results - NM Card Myocard Lexiscan Rest/Stress Spect (09/27/2022 11:55 AM CDT)  Anatomical Region Laterality Modality   Chest, NM Cardiac   Nuclear Medicine     Results - NM Card Myocard Lexiscan Rest/Stress Spect (09/27/2022 11:55 AM CDT)  Specimen (Source) Anatomical Location / Laterality Collection  "Method / Volume Collection Time Received Time         09/27/2022 8:07 AM CDT       Results - NM Card Myocard Lexiscan Rest/Stress Spect (09/27/2022 11:55 AM CDT)  Narrative   09/27/2022 2:21 PM CDT    Summary   1. A regadenoson infusion pharmacologic stress test was performed.   2. Negative stress ECG for ST segment depression. Normal hemodynamic  response to lexiscan infusion. No ectopy noted.   3. The patient experienced shortness of breath with infusion of lexiscan  during the stress test. Symptom resolved in recovery.   4. Myocardial perfusion imaging is normal.   5. Normal left ventricular systolic function. Calculated LVEF 64 %.   6. Based on this study, the annual cardiovascular mortality rate is low  (less than 1%).    Prior Study Comparison   No prior study for comparison.         Imaging Reviewed Personally By Myself      Radiology Results:   Recent Results (from the past 24 hour(s))   POC US Guidance Needle Placement    Narrative    Ultrasound was performed as guidance to an anesthesia procedure.  Click   \"PACS images\" hyperlink below to view any stored images.  For specific   procedure details, view procedure note authored by anesthesia.   POC US Guidance Needle Placement    Narrative    Ultrasound was performed as guidance to an anesthesia procedure.  Click   \"PACS images\" hyperlink below to view any stored images.  For specific   procedure details, view procedure note authored by anesthesia.       Labs Reviewed Personally By Myself     Results for orders placed or performed during the hospital encounter of 10/12/22 (from the past 24 hour(s))   POC US Guidance Needle Placement    Narrative    Ultrasound was performed as guidance to an anesthesia procedure.  Click   \"PACS images\" hyperlink below to view any stored images.  For specific   procedure details, view procedure note authored by anesthesia.   INR - Pre-Op   Result Value Ref Range    INR 1.25 (H) 0.85 - 1.15   POC US Guidance Needle Placement    " "Narrative    Ultrasound was performed as guidance to an anesthesia procedure.  Click   \"PACS images\" hyperlink below to view any stored images.  For specific   procedure details, view procedure note authored by anesthesia.   Platelet count - Routine   Result Value Ref Range    Platelet Count 158 150 - 450 10e3/uL       Preoperative Labs Reviewed Personally By Myself     COVID-19 PCR   Asymptomatic - 2019 Novel Coronavirus (COVID-19) (10/09/2022 9:57 AM CDT)  Results - Asymptomatic - 2019 Novel Coronavirus (COVID-19) (10/09/2022 9:57 AM CDT)  Component Value Ref Range Test Method Analysis Time Performed At Pathologist [facility]   COVID-19 Interpretation Not Detected Not Detected   10/09/2022 10:34 PM CDT [facility]     Source Nares, left and right     10/09/2022 10:34 PM CDT [facility]       Results - Asymptomatic - 2019 Novel Coronavirus (COVID-19) (10/09/2022 9:57 AM CDT)  Specimen (Source) Anatomical Location / Laterality Collection Method / Volume Collection Time Received Time   Swab (Source Required)   Non-blood Collection / Unknown 10/09/2022 9:57 AM CDT 10/09/2022 9:57 AM CDT        (ABNORMAL) Complete Blood Count-W/Diff (09/19/2022 11:15 AM CDT)  Only the most recent of 2 results within the time period is included.      Results - (ABNORMAL) Complete Blood Count-W/Diff (09/19/2022 11:15 AM CDT)  Component Value Ref Range Test Method Analysis Time Performed At Pathologist [facility]   WBC 4.3 3.5 - 10.5 x10(9)/L   09/19/2022 11:38 AM CDT [facility]     RBC 4.50 3.90 - 5.03 x10(12)/L   09/19/2022 11:38 AM CDT [facility]     Hemoglobin 14.2 12.0 - 15.5 g/dL   09/19/2022 11:38 AM CDT [facility]     HCT 41.4 34.9 - 44.5 %   09/19/2022 11:38 AM CDT [facility]     MCV 92.0 80.0 - 100.0 fL   09/19/2022 11:38 AM CDT [facility]     MCH 31.6 27.6 - 33.3 pg   09/19/2022 11:38 AM CDT [facility]     MCHC 34.3 31.5 - 35.2 g/dL   09/19/2022 11:38 AM CDT [facility]     RDW 13.8 11.9 - 15.5 %   09/19/2022 11:38 " AM CDT [facility]     Platelets 178 150 - 450 x10(9)/L   09/19/2022 11:38 AM CDT [facility]     Neutrophil Absolute 3.5 1.7 - 7.0 10(9)/L   09/19/2022 11:38 AM CDT [facility]     Lymphocyte Absolute 0.5 (L) 1.0 - 4.8 10(9)/L   09/19/2022 11:38 AM CDT [facility]     Monocytes Absolute 0.3 0.2 - 0.9 10(9)/L   09/19/2022 11:38 AM CDT [facility]     Eosinophil Absolute 0.0 0.0 - 0.5 10(9)/L   09/19/2022 11:38 AM CDT [facility]     Basophil Absolute 0.1 0.0 - 0.3 10(9)/L   09/19/2022 11:38 AM CDT [facility]     Immature Gran % 0.0 0.0 - 0.5 %   09/19/2022 11:38 AM CDT [facility]       Results - (ABNORMAL) Complete Blood Count-W/Diff (09/19/2022 11:15 AM CDT)  Specimen (Source) Anatomical Location / Laterality Collection Method / Volume Collection Time Received Time   Blood   Venipuncture / Unknown 09/19/2022 11:15 AM CDT 09/19/2022 11:15 AM CDT     Results - (ABNORMAL) Complete Blood Count-W/Diff (09/19/2022 11:15 AM CDT)  Authorizing Provider Result Type   Tonia Bergman MD LAB_[email or web address]     Results - (ABNORMAL) Complete Blood Count-W/Diff (09/19/2022 11:15 AM CDT)  Performing Organization Address City/State/ZIP Code Phone Number   [facility]   [address] [place] [address] [phone]       Back to top of Results       Basic Metabolic Panel (09/19/2022 11:15 AM CDT)  Results - Basic Metabolic Panel (09/19/2022 11:15 AM CDT)  Component Value Ref Range Test Method Analysis Time Performed At [facility]   Sodium 138 136 - 145 mmol/L   09/19/2022 3:33 PM CDT [facility]     Potassium 5.0 3.5 - 5.1 mmol/L   09/19/2022 3:33 PM CDT [facility]     Comment: Specimen slightly hemolyzed. Hemolysis may affect result.   Chloride 104 98 - 109 mmol/L   09/19/2022 3:33 PM [facility]     CO2 23 20 - 29 mmol/L   09/19/2022 3:33 PM T [facility]     Anion Gap 11 7 - 16 mmol/L   09/19/2022 3:33 PM [facility]     Calcium 9.3 8.4 - 10.4 mg/dL   09/19/2022  3:33 PM T [facility]     BUN 16 7 - 26 mg/dL   09/19/2022 3:33 PM T [facility]     Creatinine 0.64 0.55 - 1.02 mg/dL   09/19/2022 3:33 PM T [facility]     GFR, Estimated >60 >60 mL/min/1.73m2   09/19/2022 3:33 PM CDT [facility]     Glucose 92 70 - 100 mg/dL   09/19/2022 3:33 PM [facility]     Comment: The given reference range is for the fasting state. Non-fasting reference range for glucose is 70 - 180 mg/dL.   Hours Fasting Unknown     09/19/2022 3:33 PM T [facility]            Thank you for this consultation.  Appreciate the opportunity to participate in the care of Sagrario Vigil, please feel free to contact us for any questions or concerns.    Thomas Almonte MD  [facility]  Phone: #[phone]

## 2022-10-12 NOTE — INTERVAL H&P NOTE
"I have reviewed the surgical (or preoperative) H&P that is linked to this encounter, and examined the patient. There are no significant changes    Clinical Conditions Present on Arrival:  Clinically Significant Risk Factors Present on Admission                 # Coagulation Defect: home medication list includes an anticoagulant medication   # Obesity: Estimated body mass index is 39.43 kg/m  as calculated from the following:    Height as of this encounter: 1.626 m (5' 4\").    Weight as of this encounter: 104.2 kg (229 lb 11.2 oz).       "

## 2022-10-12 NOTE — PHARMACY-ADMISSION MEDICATION HISTORY
Pharmacy Note - Admission Medication History    Pertinent Provider Information: Patient completed 4 day course of prednisone yesterday.   ______________________________________________________________________    Prior To Admission (PTA) med list completed and updated in EMR.       PTA Med List   Medication Sig Note Last Dose     acetaminophen (TYLENOL) 325 MG tablet Take 1-2 tablets (325-650 mg) by mouth every 6 hours as needed for mild pain  10/11/2022 at pm     azithromycin (ZITHROMAX) 250 MG tablet Before dental procedures       enoxaparin ANTICOAGULANT (LOVENOX) 100 MG/ML syringe Inject 100 mg Subcutaneous every 12 hours 10/7/2022: Spoke to patient: will take last dose by 0800 on 10/11/22, then hold it 24 hrs before surgery time.  10/11/2022 at 0900     fluorouracil (EFUDEX) 5 % external cream Apply topically 2 times daily as needed  prn     triamcinolone (KENALOG) 0.1 % external ointment Apply topically 2 times daily as needed  prn     triamterene-hydrochlorothiazide (MAXZIDE-25) 37.5-25 mg per tablet [TRIAMTERENE-HYDROCHLOROTHIAZIDE (MAXZIDE-25) 37.5-25 MG PER TABLET] Take 1 tablet by mouth daily.  10/7/2022 at Unknown time     warfarin ANTICOAGULANT (COUMADIN) 5 MG tablet Take 1.5-2 tablets (7.5-10 mg) by mouth See Admin Instructions 10mg Monday & Friday; 7.5mg ROW 10/7/2022: Spoke to patient: last dose evening of 10/6/22, then holding for surgery.  10/6/2022       Information source(s): Patient and CareEverywhere/SureScripts    Method of interview communication: in-person    Patient was asked about OTC/herbal products specifically.  PTA med list reflects this.    Based on the pharmacist's assessment, the PTA med list information appears reliable    Allergies were reviewed, assessed, and updated with the patient.      Patient did not bring any medications to the hospital and can't retrieve from home. No multi-dose medications are available for use during hospital stay.      Thank you for the opportunity to  participate in the care of this patient.      Kevin Franco [facility]     10/12/2022     7:21 AM

## 2022-10-13 ENCOUNTER — APPOINTMENT (OUTPATIENT)
Dept: PHYSICAL THERAPY | Facility: CLINIC | Age: 77
End: 2022-10-13
Attending: ORTHOPAEDIC SURGERY
Payer: MEDICARE

## 2022-10-13 VITALS
TEMPERATURE: 97.7 F | HEART RATE: 56 BPM | SYSTOLIC BLOOD PRESSURE: 147 MMHG | OXYGEN SATURATION: 97 % | RESPIRATION RATE: 16 BRPM | DIASTOLIC BLOOD PRESSURE: 63 MMHG | HEIGHT: 64 IN | WEIGHT: 229.7 LBS | BODY MASS INDEX: 39.21 KG/M2

## 2022-10-13 LAB
GLUCOSE BLD-MCNC: 119 MG/DL (ref 70–125)
HGB BLD-MCNC: 12.7 G/DL (ref 11.7–15.7)
INR PPP: 1.08 (ref 0.85–1.15)
PLATELET # BLD AUTO: 156 10E3/UL (ref 150–450)

## 2022-10-13 PROCEDURE — 97161 PT EVAL LOW COMPLEX 20 MIN: CPT | Mod: GP

## 2022-10-13 PROCEDURE — 250N000013 HC RX MED GY IP 250 OP 250 PS 637: Performed by: ORTHOPAEDIC SURGERY

## 2022-10-13 PROCEDURE — 99232 SBSQ HOSP IP/OBS MODERATE 35: CPT | Performed by: INTERNAL MEDICINE

## 2022-10-13 PROCEDURE — 36415 COLL VENOUS BLD VENIPUNCTURE: CPT | Performed by: ORTHOPAEDIC SURGERY

## 2022-10-13 PROCEDURE — 999N000111 HC STATISTIC OT IP EVAL DEFER

## 2022-10-13 PROCEDURE — 82947 ASSAY GLUCOSE BLOOD QUANT: CPT | Performed by: ORTHOPAEDIC SURGERY

## 2022-10-13 PROCEDURE — 85018 HEMOGLOBIN: CPT | Performed by: ORTHOPAEDIC SURGERY

## 2022-10-13 PROCEDURE — 85049 AUTOMATED PLATELET COUNT: CPT | Performed by: ORTHOPAEDIC SURGERY

## 2022-10-13 PROCEDURE — 85610 PROTHROMBIN TIME: CPT | Performed by: ORTHOPAEDIC SURGERY

## 2022-10-13 PROCEDURE — 250N000013 HC RX MED GY IP 250 OP 250 PS 637: Performed by: FAMILY MEDICINE

## 2022-10-13 PROCEDURE — 97110 THERAPEUTIC EXERCISES: CPT | Mod: GP

## 2022-10-13 PROCEDURE — 97116 GAIT TRAINING THERAPY: CPT | Mod: GP

## 2022-10-13 PROCEDURE — 250N000011 HC RX IP 250 OP 636: Performed by: ORTHOPAEDIC SURGERY

## 2022-10-13 RX ORDER — ENOXAPARIN SODIUM 100 MG/ML
100 INJECTION SUBCUTANEOUS EVERY 12 HOURS
Qty: 40 ML | Refills: 0 | Status: SHIPPED | OUTPATIENT
Start: 2022-10-13

## 2022-10-13 RX ORDER — ACETAMINOPHEN 325 MG/1
975 TABLET ORAL EVERY 8 HOURS
Start: 2022-10-13

## 2022-10-13 RX ORDER — ENOXAPARIN SODIUM 100 MG/ML
100 INJECTION SUBCUTANEOUS EVERY 12 HOURS
Start: 2022-10-13 | End: 2022-10-13

## 2022-10-13 RX ADMIN — ACETAMINOPHEN 975 MG: 325 TABLET, FILM COATED ORAL at 05:08

## 2022-10-13 RX ADMIN — SENNOSIDES AND DOCUSATE SODIUM 1 TABLET: 50; 8.6 TABLET ORAL at 08:52

## 2022-10-13 RX ADMIN — HYDROMORPHONE HYDROCHLORIDE 2 MG: 2 TABLET ORAL at 13:13

## 2022-10-13 RX ADMIN — POLYETHYLENE GLYCOL 3350 17 G: 17 POWDER, FOR SOLUTION ORAL at 08:52

## 2022-10-13 RX ADMIN — ENOXAPARIN SODIUM 100 MG: 100 INJECTION SUBCUTANEOUS at 08:52

## 2022-10-13 RX ADMIN — TRIAMTERENE AND HYDROCHLOROTHIAZIDE 1 TABLET: 37.5; 25 TABLET ORAL at 08:52

## 2022-10-13 ASSESSMENT — ACTIVITIES OF DAILY LIVING (ADL)
ADLS_ACUITY_SCORE: 26

## 2022-10-13 NOTE — PLAN OF CARE
Pt A&O x4, VSS. Pain has been tolerable and managed with Tylenol. LS clear, denies SOB. Ace wrap in place to RLE, no signs of active bleeding noted. Tolerating oral intake, no BMs reported but active BS noted and passing flatus. Up with Ax1 and walker, WBAT. Pt ready for discharge home today.  will be transporting patient home.    Goal Outcome Evaluation:  Problem: Knee Arthroplasty  Goal: Intact Neurovascular Status  Outcome: Met     Problem: Knee Arthroplasty  Goal: Acceptable Pain Control  Outcome: Met

## 2022-10-13 NOTE — PROGRESS NOTES
Patient vital signs are at baseline: Yes  Patient able to ambulate as they were prior to admission or with assist devices provided by therapies during their stay:  Yes  Patient MUST void prior to discharge:  Yes  Patient able to tolerate oral intake:  Yes  Pain has adequate pain control using Oral analgesics:  Yes Pt has not taken any IV pain meds, rates pain<4/10.  Does patient have an identified :  Yes  Has goal D/C date and time been discussed with patient:  Yes     A&O X 4, calls appropriately, makes needs known, denies SOB, nausea, or chest pain. Bruises on pt back open to air. Taken off contact precautions after MRSA negative result.

## 2022-10-13 NOTE — PROGRESS NOTES
Physical Therapy Discharge Summary    Reason for therapy discharge:    Discharged to home with outpatient therapy.    Progress towards therapy goal(s). See goals on Care Plan in [facility] electronic health record for goal details.  Goals met    Therapy recommendation(s):    Continue home exercise program.

## 2022-10-13 NOTE — PROGRESS NOTES
Care Management Discharge Note    Discharge Date: 10/13/2022     Discharge Disposition: Home    Discharge Services: outpatient PT    Discharge Transportation: family or friend will provide     Education Provided on the Discharge Plan:  AVS per bedside RN.    Persons Notified of Discharge Plans: patient    Patient/Family in Agreement with the Plan: yes      Additional Information:  Chart reviewed. CM met with patient. Patient lives with her . She has arranged outpatient PT.  will transport home at time of hospital discharge. Patient is concerned about her INR which was 1.08 today. Patient reports that she is usually between 2-3. CM paged Dr Santiago for clarification of the plan. Patient has been updated regarding her coumadin and INR instructions. Patient has no questions for CM.         Rossy Perry RN

## 2022-10-13 NOTE — PROGRESS NOTES
10/13/22 0803   Appointment Info   Signing Clinician's Name / Credentials (PT) Zenaida Ann   Living Environment   People in Home spouse   Current Living Arrangements house   Home Accessibility stairs to enter home;stairs within home   Number of Stairs, Main Entrance 3   Stair Railings, Main Entrance railings safe and in good condition   Number of Stairs, Within Home, Primary none   Stair Railings, Within Home, Primary none   Transportation Anticipated family or friend will provide   Living Environment Comments can stay on one level   Self-Care   Usual Activity Tolerance moderate   Equipment Currently Used at Home cane, straight   Fall history within last six months yes   Number of times patient has fallen within last six months 1   General Information   Onset of Illness/Injury or Date of Surgery 10/12/22   Pertinent History of Current Problem (include personal factors and/or comorbidities that impact the POC) R TKA   Weight-Bearing Status - RLE weight-bearing as tolerated   Cognition   Affect/Mental Status (Cognition) WFL   Follows Commands (Cognition) WFL   Posture    Posture Not impaired   Range of Motion (ROM)   Range of Motion ROM deficits secondary to surgical procedure   Strength (Manual Muscle Testing)   Strength (Manual Muscle Testing) No deficits observed during functional mobility   Transfers   Transfers sit-stand transfer   Sit-Stand Transfer   Sit-Stand [place] (Transfers) modified independence   Assistive Device (Sit-Stand Transfers) walker, front-wheeled   Comment, (Sit-Stand Transfer) vc to push off chair with hands   Gait/Stairs (Locomotion)   [place] Level (Gait) modified independence   Assistive Device (Gait) walker, front-wheeled   Distance in Feet (Required for LE Total Joints) 10   Distance in Feet (Gait) 120, 110   Pattern (Gait) swing-through   Deviations/Abnormal Patterns (Gait) sean decreased;gait speed decreased   Negotiation (Stairs) number of steps   Balance   Balance  no deficits were identified   Sensory Examination   Sensory Perception patient reports no sensory changes   Coordination   Coordination no deficits were identified   Muscle Tone   Muscle Tone no deficits were identified   Clinical Impression   Criteria for Skilled Therapeutic Intervention Yes, treatment indicated   PT Diagnosis (PT) impaired functional mobility   Influenced by the following impairments weakness, pain   Functional limitations due to impairments transfers, gait   Clinical Presentation (PT Evaluation Complexity) Stable/Uncomplicated   Clinical Presentation Rationale Pt. presents as medically diagnosed   Clinical Decision Making (Complexity) low complexity   Planned Therapy Interventions (PT) gait training;home exercise program;stair training;transfer training;patient/family education   Anticipated Equipment Needs at Discharge (PT) walker, rolling   Risk & Benefits of therapy have been explained evaluation/treatment results reviewed;participants included;patient   PT Total Evaluation Time   PT Eval, Low Complexity Minutes (92445) 10   Plan of Care Review   Plan of Care Reviewed With patient   Physical Therapy Goals   PT Frequency One time eval and treatment only   PT Predicted Duration/Target Date for Goal Attainment 10/13/22   PT Goals Transfers;Gait;Stairs;PT Goal 1   PT: Transfers Modified independent;Sit to/from stand;Within precautions;Goal Met   PT: Gait Modified independent;Rolling walker;100 feet;Within precautions;Goal Met   PT: Stairs Supervision/stand-by assist;4 stairs;Rail on left;Goal Met   PT: Goal 1 I with HEP, goal met   Therapeutic Procedure/Exercise   Ther. Procedure: strength, endurance, ROM, flexibillity Minutes (13693) 15   Symptoms Noted During/After Treatment none   Treatment Detail/Skilled Intervention TKA HEP x 10 cueing and instruction needed for patient to complete   Gait Training   Gait Training Minutes (32238) 15   Symptoms Noted During/After Treatment (Gait Training)  fatigue;increased pain   Treatment Detail/Skilled Intervention stable gait, vc for safety, heel strike.  Stairs 4 steps bilat rail, 4 steps with R rail only.  vc for non recip steps   [place] Level (Gait Training) independent   Physical Assistance Level (Gait Training) verbal cues   Weight Bearing (Gait Training) weight-bearing as tolerated   Assistive Device (Gait Training) rolling walker   Pattern Analysis (Gait Training) swing-through gait   Impairments (Gait Analysis/Training) pain;strength decreased   Stair Railings present at both sides;present on right side   Physical Assist/Nonphysical Assist (Stairs) supervision   Level of [place] (Stairs) stand-by assist   PT Discharge Planning   PT Plan dc PT   PT Discharge Recommendation (DC Rec) home with outpatient physical therapy   PT Rationale for DC Rec Paient mobilizing well and has good home setup and support   PT Brief overview of current status I with functional mobility   Total Session Time   Timed Code Treatment Minutes 30   Total Session Time (sum of timed and untimed services) 40

## 2022-10-13 NOTE — PROGRESS NOTES
[facility] Progress Note    Pt registered to outpatient.    Mary Beth Yeung PA-C  [facility]   [phone]   October 13, 2022 at 1:09 PM

## 2022-10-13 NOTE — PLAN OF CARE
OT: Orders received, chart review completed. Writer discussed care with patient, no IP OT needs were identified. Writer provided pt with a handout on compensatory strategies for a TKA. Pt is familiar with the strategies and has all the needed AE from prior hip and knee surgeries. Will complete orders.

## 2022-10-13 NOTE — PROGRESS NOTES
S Daily Progress Note    Identification summary  Sagrario Vigil is a 77 year old female with a PMH of factor V Leiden, DVT/PE, MGUS, HTN and MRSA 2015.  Underwent right total knee arthroplasty.  Doing well postoperatively.  Pain under good control.      Assessment and Plan:  -Status post right total knee arthroplasty  Pain under good control.  Further management per PT OT and orthopedics.  -History of MRSA 2015  Repeat MRSA swab negative  She is off precautions.    -Factor V Leiden heterozygote  -History of DVT and PE  -Chronic anticoagulation  Continue Lovenox, Coumadin until INR is greater than 2 she has an appointment for INR on 10/15.  Discontinue Lovenox when INR is greater than 2.  Discussed with the patient.  -Essential hypertension  Order home Maxide with hold parameters.  -History of MGUS    Principal Problem:    Knee osteoarthritis  Active Problems:    Hypertension    Factor V Leiden (H)    History of pulmonary embolism    History of MRSA infection     LOS: 1 day     Subjective:  Doing well.  Denies any shortness of breath, chest pain, nausea vomiting abdominal pain.    acetaminophen  975 mg Oral Q8H     enoxaparin ANTICOAGULANT  100 mg Subcutaneous Q12H     polyethylene glycol  17 g Oral Daily     senna-docusate  1 tablet Oral BID     sodium chloride (PF)  3 mL Intracatheter Q8H     triamterene-HCTZ  1 tablet Oral Daily     Warfarin Therapy Reminder  1 each Oral See Admin Instructions       Objective:  Vital signs in last 24 hours:  Temp:  [97.1  F (36.2  C)-98.2  F (36.8  C)] 97.6  F (36.4  C)  Pulse:  [50-64] 57  Resp:  [12-36] 16  BP: ()/(50-76) 127/61  SpO2:  [95 %-100 %] 97 %  Weight:   [unfilled]    Intake/Output last 3 shifts:  I/O last 3 completed shifts:  In: 3080 [P.O.:1380; I.V.:1700]  Out: 1625 [Urine:1525; Blood:100]  Intake/Output this shift:  No intake/output data recorded.    Review of Systems:   As per subjective, all others negative.    Physical Exam:    General Appearance:   Alert, cooperative, no distress, appears stated age   Head:  Normocephalic, without obvious abnormality, atraumatic   Lungs:   Clear to auscultation bilaterally, respirations unlabored   Chest Wall:  No tenderness or deformity   Heart:  Regular rate and rhythm, S1, S2 normal   Abdomen:   Soft, non tender, non distended, bowel sounds present, no guarding or rigidity   Extremities:  S/p right TKA   Skin: Skin color, texture, turgor normal, no rashes or lesions   Neurologic: Alert and oriented X 3, Moves all 4 extremities       Lab Results:  Personally Reviewed.   Recent Results (from the past 24 hour(s))   Platelet count - Routine    Collection Time: 10/12/22  2:34 PM   Result Value Ref Range    Platelet Count 158 150 - 450 10e3/uL   Creatinine    Collection Time: 10/12/22  2:34 PM   Result Value Ref Range    Creatinine 0.77 0.60 - 1.10 mg/dL    GFR Estimate 79 >60 mL/min/1.73m2   MRSA MSSA PCR, Nasal Swab    Collection Time: 10/12/22  4:10 PM    Specimen: Nare, Right; Swab   Result Value Ref Range    MRSA Target DNA Negative Negative    SA Target DNA Negative    Hemoglobin    Collection Time: 10/13/22  6:07 AM   Result Value Ref Range    Hemoglobin 12.7 11.7 - 15.7 g/dL   INR    Collection Time: 10/13/22  6:07 AM   Result Value Ref Range    INR 1.08 0.85 - 1.15   Platelet count - AM Draw (tomorrow)    Collection Time: 10/13/22  6:07 AM   Result Value Ref Range    Platelet Count 156 150 - 450 10e3/uL   Glucose    Collection Time: 10/13/22  6:07 AM   Result Value Ref Range    Glucose 119 70 - 125 mg/dL         Genet Santiago M.D  [facility]  Internal Medicine

## 2022-10-13 NOTE — DISCHARGE SUMMARY
[facility] Discharge Summary    Sagrario Vigil MRN# [id number]   Age: 77 year old YOB: 1945     Date of Admission:  10/12/2022  Date of Discharge::  10/13/2022  Admitting Physician:  Randolph Trotter MD  Discharge Physician:  Mary Beth Yeung PA-C             Admission Diagnoses:   OA (osteoarthritis) of knee [M17.9]  Knee osteoarthritis [M17.9]          Discharge Diagnosis:   Patient Active Problem List    Diagnosis     Knee osteoarthritis     Hypertension     Factor V Leiden (H)     History of pulmonary embolism     History of MRSA infection             Procedures:   Procedure(s): Total knee arthoplasty (Right)       No other procedures performed during this admission           Medications Prior to Admission:     Medications Prior to Admission   Medication Sig Dispense Refill Last Dose     azithromycin (ZITHROMAX) 250 MG tablet Before dental procedures        fluorouracil (EFUDEX) 5 % external cream Apply topically 2 times daily as needed   prn     triamcinolone (KENALOG) 0.1 % external ointment Apply topically 2 times daily as needed   prn     triamterene-hydrochlorothiazide (MAXZIDE-25) 37.5-25 mg per tablet [TRIAMTERENE-HYDROCHLOROTHIAZIDE (MAXZIDE-25) 37.5-25 MG PER TABLET] Take 1 tablet by mouth daily.   10/7/2022 at Unknown time     warfarin ANTICOAGULANT (COUMADIN) 5 MG tablet Take 1.5-2 tablets (7.5-10 mg) by mouth See Admin Instructions 10mg Monday & Friday; 7.5mg ROW   10/6/2022     clindamycin (CLEOCIN) 300 MG capsule Before dermatology procedures  0      EPINEPHrine (EPIPEN) 0.3 mg/0.3 mL atIn [EPINEPHRINE (EPIPEN) 0.3 MG/0.3 ML ATIN] Inject 0.3 mg into the shoulder, thigh, or buttocks.        valACYclovir (VALTREX) 1000 MG tablet Take 1 tablet (1,000 mg) by mouth as needed   prn     [DISCONTINUED] acetaminophen (TYLENOL) 325 MG tablet Take 1-2 tablets (325-650 mg) by mouth every 6 hours as needed for mild pain   10/11/2022 at pm     [DISCONTINUED] enoxaparin  ANTICOAGULANT (LOVENOX) 100 MG/ML syringe Inject 100 mg Subcutaneous every 12 hours   10/11/2022 at 0900             Discharge Medications:     Current Discharge Medication List      START taking these medications    Details   HYDROmorphone (DILAUDID) 2 MG tablet Take 1-2 tablets (2-4 mg) by mouth every 4 hours as needed for moderate to severe pain  Qty: 28 tablet, Refills: 0    Associated Diagnoses: Primary osteoarthritis of right knee      senna-docusate (SENOKOT-S/PERICOLACE) 8.6-50 MG tablet Take 1-2 tablets by mouth 2 times daily Take while on oral narcotics to prevent or treat constipation.  Qty: 30 tablet, Refills: 0    Comments: While taking narcotics  Associated Diagnoses: Primary osteoarthritis of right knee         CONTINUE these medications which have CHANGED    Details   acetaminophen (TYLENOL) 325 MG tablet Take 3 tablets (975 mg) by mouth every 8 hours    Associated Diagnoses: Factor V Leiden (H)      enoxaparin ANTICOAGULANT (LOVENOX) 100 MG/ML syringe Inject 1 mL (100 mg) Subcutaneous every 12 hours  Qty: 40 mL, Refills: 0    Comments: Patient to continue Lovenox until INR >1.8. Check INR twice weekly after discharge.  Once INR >1.8, can discontinue Lovenox and resume warfarin.  Associated Diagnoses: S/P total knee arthroplasty, right         CONTINUE these medications which have NOT CHANGED    Details   azithromycin (ZITHROMAX) 250 MG tablet Before dental procedures      fluorouracil (EFUDEX) 5 % external cream Apply topically 2 times daily as needed      triamcinolone (KENALOG) 0.1 % external ointment Apply topically 2 times daily as needed      triamterene-hydrochlorothiazide (MAXZIDE-25) 37.5-25 mg per tablet [TRIAMTERENE-HYDROCHLOROTHIAZIDE (MAXZIDE-25) 37.5-25 MG PER TABLET] Take 1 tablet by mouth daily.      warfarin ANTICOAGULANT (COUMADIN) 5 MG tablet Take 1.5-2 tablets (7.5-10 mg) by mouth See Admin Instructions 10mg Monday & Friday; 7.5mg ROW      clindamycin (CLEOCIN) 300 MG capsule  Before dermatology procedures  Refills: 0      EPINEPHrine (EPIPEN) 0.3 mg/0.3 mL atIn [EPINEPHRINE (EPIPEN) 0.3 MG/0.3 ML ATIN] Inject 0.3 mg into the shoulder, thigh, or buttocks.      valACYclovir (VALTREX) 1000 MG tablet Take 1 tablet (1,000 mg) by mouth as needed                   Consultations:   Consultation during this admission received from internal medicine          Brief History of Illness:   Reason for admission requiring a surgical or invasive procedure:   Patient Active Problem List    Diagnosis     Knee osteoarthritis     Hypertension     Factor V Leiden (H)     History of pulmonary embolism     History of MRSA infection      The patient underwent the following procedure(s):   Total knee arthoplasty (Right)   There were no immediate complications during this procedure.    Please refer to the full operative summary for details.           Hospital Course:   The patient's hospital course was unremarkable.  She recovered as anticipated and experienced no post-operative complications.           Discharge Instructions and Follow-Up:   Discharge diet: Regular   Discharge activity: Activity as tolerated  Increase activity as tolerated  Up as tolerated  Driving restricitIons: no driving while taking narcotic analgesics  Weight bearing status: Weight bearing as tolerated   Discharge follow-up: Follow up with Dr. Trotter in 2 weeks  Get INR checked twice weekly. Once INR>1.8 can discontinue lovenox and resume warfarin   Wound care: Dressing changes: dressing to remain in place until 2 week post-op  Ice to area for comfort  Keep wound clean and dry  Do not submerge in pool, hot tub, bath tub           Discharge Disposition:   Discharged to home      Attestation:  Amount of time performed on this discharge summary: 30 minutes.    Mary Beth Yeung PA-C

## 2022-10-13 NOTE — UTILIZATION REVIEW
Admission Status; Secondary Review Determination       Under the authority of the Utilization Management Committee, the utilization review process indicated a secondary review on the above patient. The review outcome is based on review of the medical records, discussions with staff, and applying clinical experience noted on the date of the review.     (x) Outpatient Status with extended recovery is appropriate - This patient does not meet hospital inpatient criteria. If this patient's primary payer is Medicare and was admitted as an inpatient, Condition Code 44 should be used and patient status changed to outpatient recovery.    RATIONALE FOR DETERMINATION     Ms. Vigil is a 77 years old woman who underwent an elective TKA . Patient was admitted to the hospital after the procedure. Patient has Medicare and the procedure is not on the CMS inpatient list. No documented complications or unexpected recovery. Patient can be safely  monitored for bleeding and recover in outpatient/extended recovery setting. She is planning on discharging home later today.     Attempting to page ortho service today to discuss the above determination.     The severity of illness, intensity of service provided, expected LOS and risk for adverse outcome doesn't meet inpatient hospital admission.     The information on this document is developed by the utilization review team in order for the business office to ensure compliance. This only denotes the appropriateness of proper admission status and does not reflect the quality of care rendered.   The definitions of Inpatient Status and Observation Status used in making the determination above are those provided in the CMS Coverage Manual, Chapter 1 and Chapter 6, section 70.4.       Sincerely,       Sherry Singletary, DO  Utilization Review  Physician Advisor  [facility].

## 2022-10-13 NOTE — DISCHARGE SUMMARY
[facility] DISCHARGE NOTE    Patient discharged to home at 2:00 PM via wheel chair. Accompanied by spouse and staff. Discharge instructions reviewed with patient, opportunity offered to ask questions. Prescriptions sent with patient to fill . All belongings sent with patient.    Merle Hagan RN

## 2022-10-13 NOTE — PROGRESS NOTES
"[facility] Progress Note      POD #: 1  S/P: Procedure(s):  RIGHT TOTAL KNEE ARTHROPLASTY    Subjective:  Patient reports 2-3/10 pain today. Has been utilizing ice packs. Undergoing PT, states walking and stairs with PT went well. Feels overall well today.     (-) nausea  (-) vomiting  (+) flatus  (-) BM      Objective:  BP (!) 147/63 (BP Location: Right arm)   Pulse 56   Temp 97.7  F (36.5  C) (Oral)   Resp 16   Ht 1.626 m (5' 4\")   Wt 104.2 kg (229 lb 11.2 oz)   SpO2 97%   BMI 39.43 kg/m        Intake/Output Summary (Last 24 hours) at 10/13/2022 0958  Last data filed at 10/13/2022 0637  Gross per 24 hour   Intake 3080 ml   Output 1625 ml   Net 1455 ml             Physical Examination:  Incision: Mepilex dressing in place at right anterior knee. Notable for mild swelling, no erythema. No purulence or drainage noted from incision.     The patient is A&Ox3. Appears comfortable, sitting up at bedside.  Sensation is intact to light touch in bilateral L2-S1 dermatomes.  Foot/Ankle Movement intact.   5/5 strength with ankle DF, PF and EHL bilaterally.  Brisk capillary refill in toes.   Palpable dorsalis pedis pulses bilaterally.    Calves aresoft and non-tender.    Labs:  No components found for: HEMOGLOBIN      Assessment:  S/P Procedure(s):  RIGHT TOTAL KNEE ARTHROPLASTY    Plan:  -Internal Med following. Appreciate their recs.  -Continue with pain management   -Diet per protocol  -PT/OT- appreciate their recs     INR obtained this AM- 1.08  -patient will be discharged with both lovenox (bridging therapy) and warfarin. Patient will continue taking lovenox until INR>1.8, then can discontinue the lovenox and restart Warfarin. INR should be checked 2x weekly.   -pt has h/o DVT/PE, on chronic anticoagulation therapy.    Dilaudid and Lovenox rx printed, signed, and in chart. Spoke with patient regarding this plan. All questions answered.   *Pt would like dose of dilaudid before discharging home.      Patient " plans to discharge home where  will be assisting with post-op and incision cares.     discharge home this AM.      Mary Beth Yeung PA-C  [facility]   [phone]   October 13, 2022 at 10:01 AM

## 2022-10-17 ENCOUNTER — DOCUMENTATION ONLY (OUTPATIENT)
Dept: OTHER | Facility: CLINIC | Age: 77
End: 2022-10-17

## 2023-10-15 ENCOUNTER — HEALTH MAINTENANCE LETTER (OUTPATIENT)
Age: 78
End: 2023-10-15

## 2024-04-09 ENCOUNTER — HOSPITAL ENCOUNTER (EMERGENCY)
Facility: CLINIC | Age: 79
Discharge: HOME | End: 2024-04-09
Attending: EMERGENCY MEDICINE | Admitting: EMERGENCY MEDICINE
Payer: COMMERCIAL

## 2024-04-09 ENCOUNTER — APPOINTMENT (OUTPATIENT)
Dept: CT IMAGING | Facility: CLINIC | Age: 79
End: 2024-04-09
Attending: EMERGENCY MEDICINE
Payer: COMMERCIAL

## 2024-04-09 VITALS
TEMPERATURE: 97.6 F | HEART RATE: 65 BPM | BODY MASS INDEX: 36.54 KG/M2 | OXYGEN SATURATION: 97 % | SYSTOLIC BLOOD PRESSURE: 148 MMHG | HEIGHT: 64 IN | WEIGHT: 214 LBS | RESPIRATION RATE: 17 BRPM | DIASTOLIC BLOOD PRESSURE: 67 MMHG

## 2024-04-09 DIAGNOSIS — S00.03XA SCALP HEMATOMA, INITIAL ENCOUNTER: ICD-10-CM

## 2024-04-09 DIAGNOSIS — S09.90XA INJURY OF HEAD, INITIAL ENCOUNTER: ICD-10-CM

## 2024-04-09 PROCEDURE — 70450 CT HEAD/BRAIN W/O DYE: CPT

## 2024-04-09 PROCEDURE — 99284 EMERGENCY DEPT VISIT MOD MDM: CPT | Mod: 25

## 2024-04-09 PROCEDURE — 72125 CT NECK SPINE W/O DYE: CPT

## 2024-04-09 ASSESSMENT — COLUMBIA-SUICIDE SEVERITY RATING SCALE - C-SSRS
6. HAVE YOU EVER DONE ANYTHING, STARTED TO DO ANYTHING, OR PREPARED TO DO ANYTHING TO END YOUR LIFE?: NO
1. IN THE PAST MONTH, HAVE YOU WISHED YOU WERE DEAD OR WISHED YOU COULD GO TO SLEEP AND NOT WAKE UP?: NO
2. HAVE YOU ACTUALLY HAD ANY THOUGHTS OF KILLING YOURSELF IN THE PAST MONTH?: NO

## 2024-04-09 ASSESSMENT — ACTIVITIES OF DAILY LIVING (ADL)
ADLS_ACUITY_SCORE: 38
ADLS_ACUITY_SCORE: 38

## 2024-04-09 NOTE — ED NOTES
Pt returned from imaging. VS obtained and updated. Pt and pts  updated on plan. Warm blanket and Ice provided to pt due to her cosmetic procedure that she had a few days prior. No additional needs at this time.

## 2024-04-09 NOTE — ED NOTES
Bed: [facility]  Expected date:   Expected time:   Means of arrival: Ambulance  Comments:  [facility]

## 2024-04-09 NOTE — ED TRIAGE NOTES
Pt arrives via EMS after a fall that occurred when she tripped on a rug while walking into an appointment today.  Pt denies LOC.  Pt is on blood thinners.  Pt has no complaints of pain.  Laceration to posterior head and skin abrasion/tear to left hand.     Triage Assessment (Adult)       Row Name 04/09/24 1146          Triage Assessment    Airway WDL WDL        Respiratory WDL    Respiratory WDL WDL        Peripheral/Neurovascular WDL    Peripheral Neurovascular WDL WDL

## 2024-04-09 NOTE — ED PROVIDER NOTES
EMERGENCY DEPARTMENT ENCOUNTER      NAME: Sagrario Vigil  AGE: 78 year old female  YOB: 1945  MRN: [id number]  EVALUATION DATE & TIME: 2024 11:28 AM    PCP: Tonia Bergman    ED PROVIDER: Dom Dee D.O.      Chief Complaint   Patient presents with    Fall       FINAL IMPRESSION:  1. Scalp hematoma, initial encounter    2. Injury of head, initial encounter        ED COURSE & MEDICAL DECISION MAKIN:40 AM I met with the patient to gather history and to perform my initial exam. I discussed the plan for care while in the Emergency Department.  1:44 PM Rechecked and updated the patient. We discussed the plan for discharge and the patient is agreeable. Reviewed supportive cares, symptomatic treatment, outpatient follow up, and reasons to return to the Emergency Department. Patient to be discharged by ED RN.          Pertinent Labs & Imaging studies reviewed. (See chart for details)  78 year old female presents to the Emergency Department for evaluation of head injury status post mechanical fall.  Patient fell twice, first time was tripping over a rug, second time when she hit her head was a  was trying to help her up off the floor.  Small abrasion to the posterior scalp.  Initial differential did include intracranial hemorrhage, skull fracture, contusion, concussion, other acute process.  CT imaging does not show any evidence of fracture or intercranial bleeding.  The injury in the back of her head appears to be more of an abrasion, was unable to show any separation of the skin, therefore did not believe it required sutures.  At this time I believe the patient is appropriate for discharge home with outpatient follow-up with primary care provider.  Return precautions were discussed.    Medical Decision Making  Obtained supplemental history:Supplemental history obtained?: No  Reviewed external records: External records reviewed?: Documented in chart  Care impacted by chronic  illness:Anticoagulated State  Care significantly affected by social determinants of health:N/A  Did you consider but not order tests?: Work up considered but not performed and documented in chart, if applicable  Did you interpret images independently?: Independent interpretation of ECG and images noted in documentation, when applicable.  Consultation discussion with other provider:Did you involve another provider (consultant, , pharmacy, etc.)?: No  Discharge. No recommendations on prescription strength medication(s). See documentation for any additional details.    At the conclusion of the encounter I discussed the results of all of the tests and the disposition. The questions were answered. The patient or family acknowledged understanding and was agreeable with the care plan.        HPI    Patient information was obtained from: Patient    Use of : N/A     Sagrario Vigil is a 78 year old female who presents for evaluation after a fall.    Patient reports that she was walking into her plastic surgery clinic this morning for a postop visit after her procedure yesterday when she tripped over a rug in the entryway and fell to the floor.  Her  was with her and when he tried to help her up they both fell back down to the floor.  During the second fall the patient hit the back of her head.  Patient is typically anticoagulated.  She stopped her warfarin last week in preparation for the procedure yesterday.  She had been taking Lovenox on 3/4, 3/5, 3/6; she then stopped the Lovenox Sunday and Monday, but took a dose of it this morning.  She has not restarted the warfarin yet.  She did not lose consciousness when she fell.  She did notice an area of bleeding to the back of her head which the plastic surgeon thought was more of an abrasion than a laceration.    Per Chart Review: Primary care visit 3/29/2024 for preop visit prior to plastic surgery procedure, patient anticoagulated on warfarin for  heterozygous factor V Leiden with history of DVT and PE in February 2020.      REVIEW OF SYSTEMS  Constitutional:  Denies fever, chills, weight loss or weakness  Eyes:  No pain, discharge, redness  HENT:  Denies sore throat, ear pain, congestion  Respiratory: No SOB, wheeze or cough  Cardiovascular:  No CP, palpitations  GI:  Denies abdominal pain, nausea, vomiting, diarrhea  : Denies dysuria, hematuria  Musculoskeletal:  Denies any new muscle/joint pain, swelling or loss of function.  Skin:  Denies rash, pallor.  Positive for head wound.  Neurologic:  Denies headache, focal weakness or sensory changes  Lymph: Denies swollen nodes    All other systems negative unless noted in HPI.    PAST MEDICAL HISTORY:  Past Medical History:   Diagnosis Date    Arthritis     Osteoarthritis    Coagulation disorder (H)     Factor V Leiden Hx of DVT and PE (in 2/2020)    Hypertension     MRSA infection 12/2015    Hx of MRSA infaction post-op knee and was placed on ABX    Squamous cell carcinoma of skin of face     with MOHs procedure       PAST SURGICAL HISTORY:  Past Surgical History:   Procedure Laterality Date    ARTHROPLASTY KNEE Right 10/12/2022    Procedure: RIGHT TOTAL KNEE ARTHROPLASTY;  Surgeon: Randolph Trotter MD;  Location: [facility] Main OR    FIBULA FRACTURE SURGERY      JOINT REPLACEMENT Bilateral     hip    LUMPECTOMY BREAST      benign    MOHS MICROGRAPHIC PROCEDURE      OTHER SURGICAL HISTORY      fractured leg    PICC AND MIDLINE TEAM LINE INSERTION  12/7/2015         [facility] REVISE KNEE JOINT REPLACE,ALL PARTS Left 12/6/2015    Procedure: Left Knee Incision and Drainage TIBIAL INSERT EXCHANGE;  Surgeon: Rinku Evans MD;  Location: [facility];  Service: Orthopedics    [facility] TOTAL KNEE ARTHROPLASTY Left 4/27/2015    Procedure: KNEE TOTAL ARTHROPLASTY LEFT;  Surgeon: Randolph Trotter MD;  Location: [facility];  Service: Orthopedics         CURRENT MEDICATIONS:    No current facility-administered  medications for this encounter.     Current Outpatient Medications   Medication Sig Dispense Refill    acetaminophen (TYLENOL) 325 MG tablet Take 3 tablets (975 mg) by mouth every 8 hours      azithromycin (ZITHROMAX) 250 MG tablet Before dental procedures      clindamycin (CLEOCIN) 300 MG capsule Before dermatology procedures  0    enoxaparin ANTICOAGULANT (LOVENOX) 100 MG/ML syringe Inject 1 mL (100 mg) Subcutaneous every 12 hours 40 mL 0    EPINEPHrine (EPIPEN) 0.3 mg/0.3 mL atIn [EPINEPHRINE (EPIPEN) 0.3 MG/0.3 ML ATIN] Inject 0.3 mg into the shoulder, thigh, or buttocks.      fluorouracil (EFUDEX) 5 % external cream Apply topically 2 times daily as needed      HYDROmorphone (DILAUDID) 2 MG tablet Take 1-2 tablets (2-4 mg) by mouth every 4 hours as needed for moderate to severe pain 28 tablet 0    senna-docusate (SENOKOT-S/PERICOLACE) 8.6-50 MG tablet Take 1-2 tablets by mouth 2 times daily Take while on oral narcotics to prevent or treat constipation. 30 tablet 0    triamcinolone (KENALOG) 0.1 % external ointment Apply topically 2 times daily as needed      triamterene-hydrochlorothiazide (MAXZIDE-25) 37.5-25 mg per tablet [TRIAMTERENE-HYDROCHLOROTHIAZIDE (MAXZIDE-25) 37.5-25 MG PER TABLET] Take 1 tablet by mouth daily.      valACYclovir (VALTREX) 1000 MG tablet Take 1 tablet (1,000 mg) by mouth as needed      warfarin ANTICOAGULANT (COUMADIN) 5 MG tablet Take 1.5-2 tablets (7.5-10 mg) by mouth See Admin Instructions 10mg Monday & Friday; 7.5mg ROW           ALLERGIES:  Allergies   Allergen Reactions    Fish Allergy Anaphylaxis     Fresh water fish, can eat shrimp    Fish Containing Products [Fish-Derived Products] Anaphylaxis     Fresh water fish, can eat shrimp    Adhesive Tape     Amoxicillin Hives    Bacitra-Neomycin-Polymyxin-Hc Itching    Bacitracin Itching    Banana GI Disturbance     Not latex    Codeine Hives     Tolerates oxycodone    Food Nausea     Melons, bananas, kiwi, avocodo    Indocin  "[Indomethacin] Hives    Kiwi Extract Nausea     Melons, bananas, kiwi, avocodo    Morphine And Related Hives    Neosporin (Nlz-Uno-Gwrxu) [Neomycin-Bacitracin Zn-Polymyx] Itching    Nsaids Hives    Oseltamivir Other (See Comments)     Hallucinations- can tolerate taking this       FAMILY HISTORY:  Family History   Problem Relation Age of Onset    Cancer Mother     Heart Disease Mother     Cancer Father     Heart Disease Father        SOCIAL HISTORY:  Social History     Socioeconomic History    Marital status:    Tobacco Use    Smoking status: Former     Packs/day: 0.25     Years: 25.00     Additional pack years: 0.00     Total pack years: 6.25     Types: Cigarettes    Smokeless tobacco: Never   Substance and Sexual Activity    Alcohol use: Yes     Comment: 2-3 ETOH WKLY    Drug use: No    Sexual activity: Yes       VITALS:  Patient Vitals for the past 24 hrs:   BP Temp Temp src Pulse Resp SpO2 Height Weight   04/09/24 1331 (!) 148/67 -- -- 65 17 97 % -- --   04/09/24 1144 (!) 148/75 97.6  F (36.4  C) Oral 74 18 97 % 1.626 m (5' 4\") 97.1 kg (214 lb)       PHYSICAL EXAM    VITAL SIGNS: BP (!) 148/67   Pulse 65   Temp 97.6  F (36.4  C) (Oral)   Resp 17   Ht 1.626 m (5' 4\")   Wt 97.1 kg (214 lb)   SpO2 97%   BMI 36.73 kg/m      General Appearance: Well-appearing, well-nourished, no acute distress  Head:  Normocephalic, without obvious abnormality, small abrasion with underlying hematoma to the posterior scalp  Eyes:  PERRL, conjunctiva/corneas clear, EOM's intact,  ENT:  Lips, mucosa, and tongue normal, membranes are moist without pallor  Neck:  Normal ROM, symmetrical, trachea midline    Cardio:  Regular rate and rhythm, no murmur, rub or gallop, 2+ pulses symmetric in all extremities  Pulm:  Clear to auscultation bilaterally, respirations unlabored,  Abdomen:  Soft, non-tender, no rebound or guarding.  Musculoskeletal: Full ROM, no edema, no cyanosis, good ROM of major joints  Neurologic:  Alert & " oriented.  No focal deficits appreciated.  Ambulatory.  Psychiatric:  Affect normal, Judgment normal, Mood normal.      LABS  Results for orders placed or performed during the hospital encounter of 04/09/24 (from the past 24 hour(s))   Head CT w/o contrast    Narrative    EXAM: CT HEAD W/O CONTRAST, CT CERVICAL SPINE W/O CONTRAST  LOCATION: [facility]  DATE: 4/9/2024    INDICATION: Trauma  COMPARISON: None.  TECHNIQUE:   1) Routine CT Head without IV contrast. Multiplanar reformats. Dose reduction techniques were used.  2) Routine CT Cervical Spine without IV contrast. Multiplanar reformats. Dose reduction techniques were used.    FINDINGS:   HEAD CT:   INTRACRANIAL CONTENTS: No intracranial hemorrhage, extraaxial collection, or mass effect.  No CT evidence of acute infarct. Normal parenchymal attenuation for age. Normal ventricles and sulci for age.     VISUALIZED ORBITS/SINUSES/MASTOIDS: No intraorbital abnormality. No paranasal sinus mucosal disease. No middle ear or mastoid effusion.    BONES/SOFT TISSUES: Posterior occipital scalp soft tissue hematoma. Calvarium is intact.    CERVICAL SPINE CT:   VERTEBRA: Normal vertebral body heights and alignment. No fracture or posttraumatic subluxation.     CANAL/FORAMINA: No high-grade spinal canal stenosis. Disc bulge at C6-C7 produces mild to moderate spinal canal stenosis. Moderate to severe foraminal stenosis left at C2-C3 and bilaterally at C3-C4. Severe foraminal stenosis right at C6-C7    PARASPINAL: No extraspinal abnormality. Visualized lung fields are clear.      Impression    IMPRESSION:  HEAD CT:  1.  No acute intracranial process.    CERVICAL SPINE CT:  1.  No CT evidence for acute fracture or post traumatic subluxation.  2.  Cervical spondylosis as above.   CT Cervical Spine w/o Contrast    Narrative    EXAM: CT HEAD W/O CONTRAST, CT CERVICAL SPINE W/O CONTRAST  LOCATION: [facility]  DATE:  4/9/2024    INDICATION: Trauma  COMPARISON: None.  TECHNIQUE:   1) Routine CT Head without IV contrast. Multiplanar reformats. Dose reduction techniques were used.  2) Routine CT Cervical Spine without IV contrast. Multiplanar reformats. Dose reduction techniques were used.    FINDINGS:   HEAD CT:   INTRACRANIAL CONTENTS: No intracranial hemorrhage, extraaxial collection, or mass effect.  No CT evidence of acute infarct. Normal parenchymal attenuation for age. Normal ventricles and sulci for age.     VISUALIZED ORBITS/SINUSES/MASTOIDS: No intraorbital abnormality. No paranasal sinus mucosal disease. No middle ear or mastoid effusion.    BONES/SOFT TISSUES: Posterior occipital scalp soft tissue hematoma. Calvarium is intact.    CERVICAL SPINE CT:   VERTEBRA: Normal vertebral body heights and alignment. No fracture or posttraumatic subluxation.     CANAL/FORAMINA: No high-grade spinal canal stenosis. Disc bulge at C6-C7 produces mild to moderate spinal canal stenosis. Moderate to severe foraminal stenosis left at C2-C3 and bilaterally at C3-C4. Severe foraminal stenosis right at C6-C7    PARASPINAL: No extraspinal abnormality. Visualized lung fields are clear.      Impression    IMPRESSION:  HEAD CT:  1.  No acute intracranial process.    CERVICAL SPINE CT:  1.  No CT evidence for acute fracture or post traumatic subluxation.  2.  Cervical spondylosis as above.         RADIOLOGY  CT Cervical Spine w/o Contrast   Final Result   IMPRESSION:   HEAD CT:   1.  No acute intracranial process.      CERVICAL SPINE CT:   1.  No CT evidence for acute fracture or post traumatic subluxation.   2.  Cervical spondylosis as above.      Head CT w/o contrast   Final Result   IMPRESSION:   HEAD CT:   1.  No acute intracranial process.      CERVICAL SPINE CT:   1.  No CT evidence for acute fracture or post traumatic subluxation.   2.  Cervical spondylosis as above.             MEDICATIONS GIVEN IN THE EMERGENCY:  Medications - No data to  display    NEW PRESCRIPTIONS STARTED AT TODAY'S ER VISIT  New Prescriptions    No medications on file        I, Jovani Gayle, am serving as a scribe to document services personally performed by Dom Dee D.O., based on my observations and the provider's statements to me.  I, Dom Dee D.O., attest that Jovani Gayle is acting in a scribe capacity, has observed my performance of the services and has documented them in accordance with my direction.     Dom Dee D.O.  Emergency Medicine  [facility] EMERGENCY ROOM  [address] [place] [id number]  [phone]  Dept: [phone]     Dom Dee,   04/09/24 1427

## 2024-12-08 ENCOUNTER — HEALTH MAINTENANCE LETTER (OUTPATIENT)
Age: 79
End: 2024-12-08

## (undated) DEVICE — BLADE SAW SAGITTAL STRK DUAL CUT 4118-135-090

## (undated) DEVICE — SU STRATAFIX PDS PLUS 2-0 SPIRAL CT-1 30CM SXPP1B410

## (undated) DEVICE — SOL WATER IRRIG 1000ML BOTTLE 2F7114

## (undated) DEVICE — DRESSING MEPILEX AG SILVER 4X12 395990

## (undated) DEVICE — DECANTER VIAL 2006S

## (undated) DEVICE — GLOVE BIOGEL PI ULTRATOUCH G SZ 8.5 42185

## (undated) DEVICE — SUCTION MANIFOLD NEPTUNE 2 SYS 4 PORT 0702-020-000

## (undated) DEVICE — BANDAGE ELASTIC 6X550 LF DBL 593-96LF

## (undated) DEVICE — DRESSING MEPILEX BORDER POST-OP 4X8

## (undated) DEVICE — CUSTOM PACK TOTAL KNEE SOP5BTKHEC

## (undated) DEVICE — PLATE GROUNDING ADULT W/CORD 9165L

## (undated) DEVICE — SUTURE VICRYL+ 2-0 27IN CT-1 UND VCP259H

## (undated) DEVICE — GLOVE BIOGEL PI INDICATOR 8.0 LF 41680

## (undated) DEVICE — SUTURE VICRYL+ 2 27IN CP UNDYED VCP195H

## (undated) DEVICE — CUSTOM PACK TOTAL KNEE ACCESSORY SOP5BTAHEA

## (undated) DEVICE — SUTURE VICRYL+ 1 27IN CT-1 UND VCP261H

## (undated) DEVICE — SOL NACL 0.9% IRRIG 1000ML BOTTLE 2F7124

## (undated) DEVICE — SU MONOCRYL 3-0 PS-2 18" UND MCP497G

## (undated) DEVICE — HOLDER LIMB VELCRO OR 0814-1533

## (undated) DEVICE — DRAPE SHEET REV FOLD 3/4 9349

## (undated) DEVICE — A3 SUPPLIES- SEE NURSING INFO PAGE

## (undated) DEVICE — GLOVE UNDER INDICATOR PI SZ 8.5 LF 41685

## (undated) DEVICE — CUFF TOURN 30IN STRL DISP 5921030235

## (undated) DEVICE — ADH SKIN CLOSURE PREMIERPRO EXOFIN 1.0ML 3470

## (undated) DEVICE — GLOVE BIOGEL PI ULTRATOUCH G SZ 8.0 42180

## (undated) DEVICE — SU STRATAFIX PDS PLUS 1 CT-1 18" SXPP1A404

## (undated) DEVICE — SOL NACL 0.9% INJ 1000ML BAG 2B1324X

## (undated) RX ORDER — ALBUTEROL SULFATE 90 UG/1
AEROSOL, METERED RESPIRATORY (INHALATION)
Status: DISPENSED
Start: 2022-10-12

## (undated) RX ORDER — DEXAMETHASONE SODIUM PHOSPHATE 10 MG/ML
INJECTION, EMULSION INTRAMUSCULAR; INTRAVENOUS
Status: DISPENSED
Start: 2022-10-12

## (undated) RX ORDER — LIDOCAINE HYDROCHLORIDE 10 MG/ML
INJECTION, SOLUTION EPIDURAL; INFILTRATION; INTRACAUDAL; PERINEURAL
Status: DISPENSED
Start: 2022-10-12

## (undated) RX ORDER — ONDANSETRON 2 MG/ML
INJECTION INTRAMUSCULAR; INTRAVENOUS
Status: DISPENSED
Start: 2022-10-12

## (undated) RX ORDER — GLYCOPYRROLATE 0.2 MG/ML
INJECTION INTRAMUSCULAR; INTRAVENOUS
Status: DISPENSED
Start: 2022-10-12

## (undated) RX ORDER — FENTANYL CITRATE-0.9 % NACL/PF 10 MCG/ML
PLASTIC BAG, INJECTION (ML) INTRAVENOUS
Status: DISPENSED
Start: 2022-10-12

## (undated) RX ORDER — PROPOFOL 10 MG/ML
INJECTION, EMULSION INTRAVENOUS
Status: DISPENSED
Start: 2022-10-12